# Patient Record
Sex: MALE | Race: BLACK OR AFRICAN AMERICAN | URBAN - METROPOLITAN AREA
[De-identification: names, ages, dates, MRNs, and addresses within clinical notes are randomized per-mention and may not be internally consistent; named-entity substitution may affect disease eponyms.]

---

## 2023-03-17 ENCOUNTER — HOSPITAL ENCOUNTER (INPATIENT)
Age: 68
LOS: 5 days | Discharge: NURSING, REHAB OR LONG-TERM CARE | DRG: 698 | End: 2023-03-22
Attending: EMERGENCY MEDICINE | Admitting: INTERNAL MEDICINE

## 2023-03-17 ENCOUNTER — APPOINTMENT (OUTPATIENT)
Dept: CT IMAGING | Age: 68
DRG: 698 | End: 2023-03-17
Attending: INTERNAL MEDICINE

## 2023-03-17 ENCOUNTER — APPOINTMENT (OUTPATIENT)
Dept: GENERAL RADIOLOGY | Age: 68
DRG: 698 | End: 2023-03-17
Attending: INTERNAL MEDICINE

## 2023-03-17 ENCOUNTER — APPOINTMENT (OUTPATIENT)
Dept: GENERAL RADIOLOGY | Age: 68
DRG: 698 | End: 2023-03-17
Attending: EMERGENCY MEDICINE

## 2023-03-17 DIAGNOSIS — N39.0 SEPSIS DUE TO GRAM-NEGATIVE UTI (HCC): Primary | ICD-10-CM

## 2023-03-17 DIAGNOSIS — A41.9 SEVERE SEPSIS (HCC): ICD-10-CM

## 2023-03-17 DIAGNOSIS — E16.2 HYPOGLYCEMIA: ICD-10-CM

## 2023-03-17 DIAGNOSIS — R65.20 SEVERE SEPSIS (HCC): ICD-10-CM

## 2023-03-17 DIAGNOSIS — A41.50 SEPSIS DUE TO GRAM-NEGATIVE UTI (HCC): Primary | ICD-10-CM

## 2023-03-17 LAB
ALBUMIN SERPL-MCNC: 1.3 G/DL (ref 3.5–5)
ALBUMIN/GLOB SERPL: 0.3 (ref 1.1–2.2)
ALP SERPL-CCNC: 176 U/L (ref 45–117)
ALT SERPL-CCNC: 110 U/L (ref 12–78)
ANION GAP BLD CALC-SCNC: 20 (ref 10–20)
ANION GAP SERPL CALC-SCNC: 2 MMOL/L (ref 5–15)
APPEARANCE UR: CLEAR
AST SERPL-CCNC: 109 U/L (ref 15–37)
BACTERIA URNS QL MICRO: NEGATIVE /HPF
BASE DEFICIT BLD-SCNC: 14.9 MMOL/L
BASOPHILS # BLD: 0 K/UL (ref 0–0.1)
BASOPHILS # BLD: 0 K/UL (ref 0–0.1)
BASOPHILS NFR BLD: 0 % (ref 0–1)
BASOPHILS NFR BLD: 0 % (ref 0–1)
BILIRUB SERPL-MCNC: 0.3 MG/DL (ref 0.2–1)
BILIRUB UR QL: NEGATIVE
BUN SERPL-MCNC: 10 MG/DL (ref 6–20)
BUN/CREAT SERPL: 16 (ref 12–20)
CA-I BLD-MCNC: 0.53 MMOL/L (ref 1.12–1.32)
CALCIUM SERPL-MCNC: 6.9 MG/DL (ref 8.5–10.1)
CHLORIDE BLD-SCNC: 120 MMOL/L (ref 100–108)
CHLORIDE SERPL-SCNC: 107 MMOL/L (ref 97–108)
CO2 BLD-SCNC: 10 MMOL/L (ref 19–24)
CO2 SERPL-SCNC: 27 MMOL/L (ref 21–32)
COLOR UR: ABNORMAL
CREAT SERPL-MCNC: 0.62 MG/DL (ref 0.7–1.3)
CREAT UR-MCNC: 0.5 MG/DL (ref 0.6–1.3)
DIFFERENTIAL METHOD BLD: ABNORMAL
DIFFERENTIAL METHOD BLD: ABNORMAL
EOSINOPHIL # BLD: 0 K/UL (ref 0–0.4)
EOSINOPHIL # BLD: 0 K/UL (ref 0–0.4)
EOSINOPHIL NFR BLD: 0 % (ref 0–7)
EOSINOPHIL NFR BLD: 0 % (ref 0–7)
EPITH CASTS URNS QL MICRO: ABNORMAL /LPF
ERYTHROCYTE [DISTWIDTH] IN BLOOD BY AUTOMATED COUNT: 21.8 % (ref 11.5–14.5)
ERYTHROCYTE [DISTWIDTH] IN BLOOD BY AUTOMATED COUNT: 21.8 % (ref 11.5–14.5)
GLOBULIN SER CALC-MCNC: 4.6 G/DL (ref 2–4)
GLUCOSE BLD STRIP.AUTO-MCNC: 39 MG/DL (ref 74–106)
GLUCOSE BLD STRIP.AUTO-MCNC: 69 MG/DL (ref 65–117)
GLUCOSE BLD STRIP.AUTO-MCNC: 92 MG/DL (ref 65–117)
GLUCOSE BLD STRIP.AUTO-MCNC: 99 MG/DL (ref 65–117)
GLUCOSE BLD STRIP.AUTO-MCNC: 99 MG/DL (ref 65–117)
GLUCOSE SERPL-MCNC: 118 MG/DL (ref 65–100)
GLUCOSE UR STRIP.AUTO-MCNC: NEGATIVE MG/DL
HCO3 BLDA-SCNC: 11 MMOL/L
HCT VFR BLD AUTO: 26 % (ref 36.6–50.3)
HCT VFR BLD AUTO: 27.6 % (ref 36.6–50.3)
HGB BLD-MCNC: 7.8 G/DL (ref 12.1–17)
HGB BLD-MCNC: 8 G/DL (ref 12.1–17)
HGB UR QL STRIP: ABNORMAL
IMM GRANULOCYTES # BLD AUTO: 0.1 K/UL (ref 0–0.04)
IMM GRANULOCYTES # BLD AUTO: 0.2 K/UL (ref 0–0.04)
IMM GRANULOCYTES NFR BLD AUTO: 1 % (ref 0–0.5)
IMM GRANULOCYTES NFR BLD AUTO: 1 % (ref 0–0.5)
KETONES UR QL STRIP.AUTO: NEGATIVE MG/DL
LACTATE BLD-SCNC: 0.74 MMOL/L (ref 0.4–2)
LACTATE BLD-SCNC: 2.35 MMOL/L (ref 0.4–2)
LEUKOCYTE ESTERASE UR QL STRIP.AUTO: ABNORMAL
LYMPHOCYTES # BLD: 1.1 K/UL (ref 0.8–3.5)
LYMPHOCYTES # BLD: 1.5 K/UL (ref 0.8–3.5)
LYMPHOCYTES NFR BLD: 8 % (ref 12–49)
LYMPHOCYTES NFR BLD: 8 % (ref 12–49)
MCH RBC QN AUTO: 25.2 PG (ref 26–34)
MCH RBC QN AUTO: 25.7 PG (ref 26–34)
MCHC RBC AUTO-ENTMCNC: 29 G/DL (ref 30–36.5)
MCHC RBC AUTO-ENTMCNC: 30 G/DL (ref 30–36.5)
MCV RBC AUTO: 85.8 FL (ref 80–99)
MCV RBC AUTO: 87.1 FL (ref 80–99)
MONOCYTES # BLD: 0.3 K/UL (ref 0–1)
MONOCYTES # BLD: 0.4 K/UL (ref 0–1)
MONOCYTES NFR BLD: 2 % (ref 5–13)
MONOCYTES NFR BLD: 2 % (ref 5–13)
NEUTS SEG # BLD: 11.7 K/UL (ref 1.8–8)
NEUTS SEG # BLD: 16.8 K/UL (ref 1.8–8)
NEUTS SEG NFR BLD: 89 % (ref 32–75)
NEUTS SEG NFR BLD: 89 % (ref 32–75)
NITRITE UR QL STRIP.AUTO: NEGATIVE
NRBC # BLD: 0 K/UL (ref 0–0.01)
NRBC # BLD: 0 K/UL (ref 0–0.01)
NRBC BLD-RTO: 0 PER 100 WBC
NRBC BLD-RTO: 0 PER 100 WBC
PCO2 BLDV: 24 MMHG (ref 41–51)
PH BLDV: 7.26 (ref 7.32–7.42)
PH UR STRIP: 6.5 (ref 5–8)
PLATELET # BLD AUTO: 349 K/UL (ref 150–400)
PLATELET # BLD AUTO: 388 K/UL (ref 150–400)
PMV BLD AUTO: 10.2 FL (ref 8.9–12.9)
PMV BLD AUTO: 9.8 FL (ref 8.9–12.9)
PO2 BLDV: 25 MMHG (ref 25–40)
POTASSIUM BLD-SCNC: 1.9 MMOL/L (ref 3.5–5.5)
POTASSIUM SERPL-SCNC: 4.1 MMOL/L (ref 3.5–5.1)
PROT SERPL-MCNC: 5.9 G/DL (ref 6.4–8.2)
PROT UR STRIP-MCNC: NEGATIVE MG/DL
RBC # BLD AUTO: 3.03 M/UL (ref 4.1–5.7)
RBC # BLD AUTO: 3.17 M/UL (ref 4.1–5.7)
RBC #/AREA URNS HPF: ABNORMAL /HPF (ref 0–5)
RBC MORPH BLD: ABNORMAL
RBC MORPH BLD: ABNORMAL
SAO2 % BLD: 38 %
SERVICE CMNT-IMP: NORMAL
SODIUM BLD-SCNC: 150 MMOL/L (ref 136–145)
SODIUM SERPL-SCNC: 136 MMOL/L (ref 136–145)
SP GR UR REFRACTOMETRY: 1.01
SPECIMEN SITE: ABNORMAL
UA: UC IF INDICATED,UAUC: ABNORMAL
UROBILINOGEN UR QL STRIP.AUTO: 0.2 EU/DL (ref 0.2–1)
WBC # BLD AUTO: 13.2 K/UL (ref 4.1–11.1)
WBC # BLD AUTO: 18.9 K/UL (ref 4.1–11.1)
WBC MORPH BLD: ABNORMAL
WBC URNS QL MICRO: ABNORMAL /HPF (ref 0–4)
YEAST BUDDING URNS QL: PRESENT

## 2023-03-17 PROCEDURE — 80053 COMPREHEN METABOLIC PANEL: CPT

## 2023-03-17 PROCEDURE — 70450 CT HEAD/BRAIN W/O DYE: CPT

## 2023-03-17 PROCEDURE — 36415 COLL VENOUS BLD VENIPUNCTURE: CPT

## 2023-03-17 PROCEDURE — 71045 X-RAY EXAM CHEST 1 VIEW: CPT

## 2023-03-17 PROCEDURE — 87086 URINE CULTURE/COLONY COUNT: CPT

## 2023-03-17 PROCEDURE — 85025 COMPLETE CBC W/AUTO DIFF WBC: CPT

## 2023-03-17 PROCEDURE — 83605 ASSAY OF LACTIC ACID: CPT

## 2023-03-17 PROCEDURE — 96366 THER/PROPH/DIAG IV INF ADDON: CPT

## 2023-03-17 PROCEDURE — 74011250636 HC RX REV CODE- 250/636: Performed by: INTERNAL MEDICINE

## 2023-03-17 PROCEDURE — 93005 ELECTROCARDIOGRAM TRACING: CPT

## 2023-03-17 PROCEDURE — 74011250636 HC RX REV CODE- 250/636: Performed by: EMERGENCY MEDICINE

## 2023-03-17 PROCEDURE — 96360 HYDRATION IV INFUSION INIT: CPT

## 2023-03-17 PROCEDURE — 96365 THER/PROPH/DIAG IV INF INIT: CPT

## 2023-03-17 PROCEDURE — 87040 BLOOD CULTURE FOR BACTERIA: CPT

## 2023-03-17 PROCEDURE — 81001 URINALYSIS AUTO W/SCOPE: CPT

## 2023-03-17 PROCEDURE — 74011000258 HC RX REV CODE- 258: Performed by: INTERNAL MEDICINE

## 2023-03-17 PROCEDURE — 99285 EMERGENCY DEPT VISIT HI MDM: CPT

## 2023-03-17 PROCEDURE — 82947 ASSAY GLUCOSE BLOOD QUANT: CPT

## 2023-03-17 PROCEDURE — 74176 CT ABD & PELVIS W/O CONTRAST: CPT

## 2023-03-17 PROCEDURE — 82962 GLUCOSE BLOOD TEST: CPT

## 2023-03-17 PROCEDURE — 96361 HYDRATE IV INFUSION ADD-ON: CPT

## 2023-03-17 PROCEDURE — 65270000046 HC RM TELEMETRY

## 2023-03-17 PROCEDURE — 74011000258 HC RX REV CODE- 258: Performed by: EMERGENCY MEDICINE

## 2023-03-17 RX ORDER — VANCOMYCIN 1.75 GRAM/500 ML IN 0.9 % SODIUM CHLORIDE INTRAVENOUS
1750 ONCE
Status: COMPLETED | OUTPATIENT
Start: 2023-03-17 | End: 2023-03-18

## 2023-03-17 RX ORDER — DEXTROSE MONOHYDRATE 100 MG/ML
0-250 INJECTION, SOLUTION INTRAVENOUS AS NEEDED
Status: DISCONTINUED | OUTPATIENT
Start: 2023-03-17 | End: 2023-03-22 | Stop reason: HOSPADM

## 2023-03-17 RX ORDER — IBUPROFEN 200 MG
4 TABLET ORAL AS NEEDED
Status: DISCONTINUED | OUTPATIENT
Start: 2023-03-17 | End: 2023-03-18

## 2023-03-17 RX ORDER — SODIUM CHLORIDE 0.9 % (FLUSH) 0.9 %
5-10 SYRINGE (ML) INJECTION AS NEEDED
Status: DISCONTINUED | OUTPATIENT
Start: 2023-03-17 | End: 2023-03-22 | Stop reason: HOSPADM

## 2023-03-17 RX ADMIN — VANCOMYCIN HYDROCHLORIDE 1750 MG: 10 INJECTION, POWDER, LYOPHILIZED, FOR SOLUTION INTRAVENOUS at 22:17

## 2023-03-17 RX ADMIN — SODIUM CHLORIDE, POTASSIUM CHLORIDE, SODIUM LACTATE AND CALCIUM CHLORIDE 1000 ML: 600; 310; 30; 20 INJECTION, SOLUTION INTRAVENOUS at 12:46

## 2023-03-17 RX ADMIN — SODIUM CHLORIDE, POTASSIUM CHLORIDE, SODIUM LACTATE AND CALCIUM CHLORIDE 986 ML: 600; 310; 30; 20 INJECTION, SOLUTION INTRAVENOUS at 13:16

## 2023-03-17 RX ADMIN — CEFEPIME 2 G: 2 INJECTION, POWDER, FOR SOLUTION INTRAVENOUS at 21:31

## 2023-03-17 RX ADMIN — CEFEPIME 2 G: 2 INJECTION, POWDER, FOR SOLUTION INTRAVENOUS at 13:00

## 2023-03-17 NOTE — ED NOTES
eTRANSFER SBAR NOTE    IP UNIT CALLED NOTE IS READY: Yes Spoke to anna    IF there are questions Call transferring nurse (your name) Kian Cherrie at phone # [phone]:    Patient is being transferred to [facility], Room# [phone]    Patient's Chief Complaint on arrival to ED was hypoglycemia and is admitted for Sepsis/hypoglycemia. CODE STATUS: Full Code    VITAL SIGNS (MUST BE WITHIN 1 HOUR OF TRANSFER TO IP UNIT:    TEMP: 97.7  PULSE: 89  RESP: 16  BP: 129/73  PAIN SCORE: 0  MEWS:      ISOLATION/PRECAUTIONS: No   ISOLATION TYPE: standard    Called outstanding consults: No      Are there sign and held orders that need to be released? No   Are all STAT orders completed: Yes    STAT labs collected: No   REPEAT LACTIC ACID DUE? No  TIME DUE:   Critical Labs Results? What? Are there any titrating drips? No   If so what? The following personal items will be sent with the patient during transfer to the floor: All valuables:   ITEM:    ITEM:    ITEM:           ASSESSMENT:    CIWA Assessment: No  Last Score:     NEURO:   NIH SCORE:        GINA SCREENING:          NEURO ASSESSMENT:   Neuro  Neurologic State: Alert (03/17/23 1028)  Orientation Level: Appropriate for age (03/17/23 1028)  Cognition: Appropriate decision making (03/17/23 1028)  Speech: Appropriate for age (03/17/23 1028)  Assessment L Pupil: Brisk (03/17/23 1028)  Assessment R Pupil: Brisk (03/17/23 1028)  LUE Motor Response: Purposeful (03/17/23 1028)  LLE Motor Response: Purposeful (03/17/23 1028)  RUE Motor Response: Purposeful (03/17/23 1028)  RLE Motor Response: Purposeful (03/17/23 1028)    Is patient impulsive? No   Is patient oriented? Yes   Do they follow commands? Yes  Is the patient ambulatory? No  Device need full assist    FALL RISK? No    Is the [place] Assessment completed? Yes  INTERVENTIONS: standard    INTEGUMENTARY:   IS THE PATIENT UNDRESSED? Yes  WOUNDS PRESENT?  No  On admit to ED No   ARE THE WOUNDS DOCUMENTED? No     RESPIRATORY:   Is patient on Oxygen? Yes   OXYGEN: Oxygen Therapy  O2 Device: None (Room air) (23 1024)  IS patient on VENT? No      CARDIAC:   Is cardiac monitoring ordered? Yes Last Rhythm: ns  Patient to transfer with tele box on? Yes  Is patient using a LIFE VEST? No     LINE ACCESS:   Peripheral IV 23 Right (Active)       Peripheral IV 23 Anterior;Proximal;Right Forearm (Active)        /GI:   CONTINENT BOWEL/BLADDER? Yes  URINARY OUTPUT: nephrostomy drains bilateral   Written Order for [facility] Cath? No   CHRONIC OR ACUTE? If CHRONIC, is it 1days old, was it changed prior to specimen collection? WAS UA WITH REFLEX SENT TO LAB? IF NO,  COLLECT AND SEND PRIOR TO TRANSPORT TO INPATIENT AREA    RESTRAINTS IN USE:      IS DOCUMENTATION COMPLETE:   Is there a current Order? When does it ?      Additional details as Needed:

## 2023-03-17 NOTE — H&P
Hospitalist Admission Note    NAME: Teofilo Márquez   :  1955   MRN:  [id number]     Date/Time:  3/17/2023 3:54 PM    Patient PCP: None  ______________________________________________________________________  Given the patient's current clinical presentation, I have a high level of concern for decompensation if discharged from the emergency department. Complex decision making was performed, which includes reviewing the patient's available past medical records, laboratory results, and x-ray films. My assessment of this patient's clinical condition and my plan of care is as follows. Assessment / Plan:    Sepsis secondary to UTI  Nephrostomy tube  Admit patient to telemetry  Start patient on IV antibiotic cefepime  Follow-up blood culture follow-up urine culture  Follow-up abdominal CT scan    Change mental status most likely secondary to metabolic encephalopathy  Follow-up head CT scan    Hypoglycemia  Start patient on hypoglycemic protocol  Monitor blood glucose  Check TSH and cortisol level       DM  Patient hypoglycemic   Continue close monitoring FSBS     History of prostate cancer    Pharmacy consult for home med list     Code Status: Full   Surrogate Decision Maker:    DVT Prophylaxis: Loevnox   GI Prophylaxis: not indicated    Baseline:       Subjective:   CHIEF COMPLAINT: change mental status     HISTORY OF PRESENT ILLNESS:     78years old male from home with past medical history significant for DM, malignant prostate cancer presented to the hospital for evaluation of change mental status, patient was found to be hypoglycemic, blood sugar was 31 at facilities, D10 was given by EMS, blood glucose was improved to 69, blood work was significant show elevated white blood cell count 18.9, urinalysis was positive for leukocyte esterase. We were asked to admit for work up and evaluation of the above problems. No past medical history on file.      No past surgical history on file.    Social History     Tobacco Use    Smoking status: Not on file    Smokeless tobacco: Not on file   Substance Use Topics    Alcohol use: Not on file        No family history on file. No Known Allergies     Prior to Admission medications    Not on File       REVIEW OF SYSTEMS:     I am not able to complete the review of systems because:    The patient is intubated and sedated    The patient has altered mental status due to his acute medical problems    The patient has baseline aphasia from prior stroke(s)    The patient has baseline dementia and is not reliable historian    The patient is in acute medical distress and unable to provide information           Total of 12 systems reviewed as follows:       POSITIVE= underlined text  Negative = text not underlined  General:  fever, chills, sweats, generalized weakness, weight loss/gain,      loss of appetite   Eyes:    blurred vision, eye pain, loss of vision, double vision  ENT:    rhinorrhea, pharyngitis   Respiratory:   cough, sputum production, SOB, PARIKH, wheezing, pleuritic pain   Cardiology:   chest pain, palpitations, orthopnea, PND, edema, syncope   Gastrointestinal:  abdominal pain , N/V, diarrhea, dysphagia, constipation, bleeding   Genitourinary:  frequency, urgency, dysuria, hematuria, incontinence   Muskuloskeletal :  arthralgia, myalgia, back pain  Hematology:  easy bruising, nose or gum bleeding, lymphadenopathy   Dermatological: rash, ulceration, pruritis, color change / jaundice  Endocrine:   hot flashes or polydipsia   Neurological:  headache, dizziness, confusion, focal weakness, paresthesia,     Speech difficulties, memory loss, gait difficulty  Psychological: Feelings of anxiety, depression, agitation    Objective:   VITALS:    Visit Vitals  BP (!) 145/84   Pulse 84   Temp (!) 95 °F (35 °C)   Resp 16   Ht 6' 1\" (1.854 m)   Wt 66.2 kg (146 lb)   SpO2 100%   BMI 19.26 kg/m²       PHYSICAL EXAM:    General:    Alert, cooperative, no distress, appears stated age. HEENT: Atraumatic, anicteric sclerae, pink conjunctivae     No oral ulcers, mucosa moist, throat clear, dentition fair  Neck:  Supple, symmetrical,  thyroid: non tender  Lungs:   Clear to auscultation bilaterally. No Wheezing or Rhonchi. No rales. Chest wall:  No tenderness  No Accessory muscle use. Heart:   Regular  rhythm,  No  murmur   No edema  Abdomen:   Soft, non-tender. Not distended. Bowel sounds normal  Extremities: No cyanosis. No clubbing,      Skin turgor normal, Capillary refill normal, Radial dial pulse 2+  Skin:     Not pale. Not Jaundiced  No rashes   Psych:  Good insight. Not depressed. Not anxious or agitated. Neurologic: EOMs intact. No facial asymmetry. No aphasia or slurred speech. Symmetrical strength, Sensation grossly intact. Alert and oriented X 4.     _______________________________________________________________________  Care Plan discussed with:    Comments   Patient     Family      RN     Care Manager                    Consultant:      _______________________________________________________________________  Expected  Disposition:   Home with Family    HH/PT/OT/RN    SNF/LTC    CHELLE    ________________________________________________________________________  TOTAL TIME:  54 Minutes    Critical Care Provided     Minutes non procedure based      Comments     Reviewed previous records   >50% of visit spent in counseling and coordination of care  Discussion with patient and/or family and questions answered       ________________________________________________________________________  Signed: Silvano Rubio MD    Procedures: see electronic medical records for all procedures/Xrays and details which were not copied into this note but were reviewed prior to creation of Plan.     LAB DATA REVIEWED:    Recent Results (from the past 24 hour(s))   GLUCOSE, POC    Collection Time: 03/17/23 10:09 AM   Result Value Ref Range    Glucose (POC) 69 65 - 117 mg/dL Performed by [employer] \"Rhys\" (TRV RN)    GLUCOSE, POC    Collection Time: 03/17/23 10:33 AM   Result Value Ref Range    Glucose (POC) 99 65 - 117 mg/dL    Performed by [employer] \"Rhys\" (TRV RN)    CBC WITH AUTOMATED DIFF    Collection Time: 03/17/23 11:35 AM   Result Value Ref Range    WBC 13.2 (H) 4.1 - 11.1 K/uL    RBC 3.17 (L) 4.10 - 5.70 M/uL    HGB 8.0 (L) 12.1 - 17.0 g/dL    HCT 27.6 (L) 36.6 - 50.3 %    MCV 87.1 80.0 - 99.0 FL    MCH 25.2 (L) 26.0 - 34.0 PG    MCHC 29.0 (L) 30.0 - 36.5 g/dL    RDW 21.8 (H) 11.5 - 14.5 %    PLATELET 915 068 - 467 K/uL    MPV 9.8 8.9 - 12.9 FL    NRBC 0.0 0  WBC    ABSOLUTE NRBC 0.00 0.00 - 0.01 K/uL    NEUTROPHILS 89 (H) 32 - 75 %    LYMPHOCYTES 8 (L) 12 - 49 %    MONOCYTES 2 (L) 5 - 13 %    EOSINOPHILS 0 0 - 7 %    BASOPHILS 0 0 - 1 %    IMMATURE GRANULOCYTES 1 (H) 0.0 - 0.5 %    ABS. NEUTROPHILS 11.7 (H) 1.8 - 8.0 K/UL    ABS. LYMPHOCYTES 1.1 0.8 - 3.5 K/UL    ABS. MONOCYTES 0.3 0.0 - 1.0 K/UL    ABS. EOSINOPHILS 0.0 0.0 - 0.4 K/UL    ABS. BASOPHILS 0.0 0.0 - 0.1 K/UL    ABS. IMM. GRANS. 0.1 (H) 0.00 - 0.04 K/UL    DF SMEAR SCANNED      RBC COMMENTS ANISOCYTOSIS  2+       POC LACTIC ACID    Collection Time: 03/17/23 11:38 AM   Result Value Ref Range    Lactic Acid (POC) 2.35 (HH) 0.40 - 2.00 mmol/L   CBC WITH AUTOMATED DIFF    Collection Time: 03/17/23 12:38 PM   Result Value Ref Range    WBC 18.9 (H) 4.1 - 11.1 K/uL    RBC 3.03 (L) 4.10 - 5.70 M/uL    HGB 7.8 (L) 12.1 - 17.0 g/dL    HCT 26.0 (L) 36.6 - 50.3 %    MCV 85.8 80.0 - 99.0 FL    MCH 25.7 (L) 26.0 - 34.0 PG    MCHC 30.0 30.0 - 36.5 g/dL    RDW 21.8 (H) 11.5 - 14.5 %    PLATELET 937 573 - 855 K/uL    MPV 10.2 8.9 - 12.9 FL    NRBC 0.0 0  WBC    ABSOLUTE NRBC 0.00 0.00 - 0.01 K/uL    NEUTROPHILS 89 (H) 32 - 75 %    LYMPHOCYTES 8 (L) 12 - 49 %    MONOCYTES 2 (L) 5 - 13 %    EOSINOPHILS 0 0 - 7 %    BASOPHILS 0 0 - 1 %    IMMATURE GRANULOCYTES 1 (H) 0.0 - 0.5 %    ABS.  NEUTROPHILS 16.8 (H) 1.8 - 8.0 K/UL    ABS. LYMPHOCYTES 1.5 0.8 - 3.5 K/UL    ABS. MONOCYTES 0.4 0.0 - 1.0 K/UL    ABS. EOSINOPHILS 0.0 0.0 - 0.4 K/UL    ABS. BASOPHILS 0.0 0.0 - 0.1 K/UL    ABS. IMM. GRANS. 0.2 (H) 0.00 - 0.04 K/UL    DF SMEAR SCANNED      RBC COMMENTS ANISOCYTOSIS  1+        WBC COMMENTS VACUOLATED POLYS     METABOLIC PANEL, COMPREHENSIVE    Collection Time: 03/17/23 12:38 PM   Result Value Ref Range    Sodium 136 136 - 145 mmol/L    Potassium 4.1 3.5 - 5.1 mmol/L    Chloride 107 97 - 108 mmol/L    CO2 27 21 - 32 mmol/L    Anion gap 2 (L) 5 - 15 mmol/L    Glucose 118 (H) 65 - 100 mg/dL    BUN 10 6 - 20 MG/DL    Creatinine 0.62 (L) 0.70 - 1.30 MG/DL    BUN/Creatinine ratio 16 12 - 20      eGFR >60 >60 ml/min/1.73m2    Calcium 6.9 (L) 8.5 - 10.1 MG/DL    Bilirubin, total 0.3 0.2 - 1.0 MG/DL    ALT (SGPT) 110 (H) 12 - 78 U/L    AST (SGOT) 109 (H) 15 - 37 U/L    Alk.  phosphatase 176 (H) 45 - 117 U/L    Protein, total 5.9 (L) 6.4 - 8.2 g/dL    Albumin 1.3 (L) 3.5 - 5.0 g/dL    Globulin 4.6 (H) 2.0 - 4.0 g/dL    A-G Ratio 0.3 (L) 1.1 - 2.2     URINALYSIS W/ REFLEX CULTURE    Collection Time: 03/17/23  2:30 PM    Specimen: Urine   Result Value Ref Range    Color YELLOW/STRAW      Appearance CLEAR CLEAR      Specific gravity 1.009      pH (UA) 6.5 5.0 - 8.0      Protein Negative NEG mg/dL    Glucose Negative NEG mg/dL    Ketone Negative NEG mg/dL    Bilirubin Negative NEG      Blood TRACE (A) NEG      Urobilinogen 0.2 0.2 - 1.0 EU/dL    Nitrites Negative NEG      Leukocyte Esterase MODERATE (A) NEG      WBC 10-20 0 - 4 /hpf    RBC 0-5 0 - 5 /hpf    Epithelial cells FEW FEW /lpf    Bacteria Negative NEG /hpf    UA:UC IF INDICATED URINE CULTURE ORDERED (A) CNI      Budding yeast PRESENT (A) NEG     BLOOD GAS,CHEM8,LACTIC ACID POC    Collection Time: 03/17/23  3:09 PM   Result Value Ref Range    pH, venous (POC) 7.26 (L) 7.32 - 7.42      pCO2, venous (POC) 24.0 (L) 41 - 51 MMHG    pO2, venous (POC) 25 25 - 40 mmHg    BICARBONATE 11 mmol/L    Base deficit (POC) 14.9 mmol/L    O2 SAT 38 %    Sodium,  (H) 136 - 145 MMOL/L    Potassium, POC 1.9 (LL) 3.5 - 5.5 MMOL/L    Chloride,  (H) 100 - 108 MMOL/L    CO2, POC 10 (L) 19 - 24 MMOL/L    Anion gap, POC 20 10 - 20      Glucose, POC 39 (LL) 74 - 106 MG/DL    Creatinine, POC 0.5 (L) 0.6 - 1.3 MG/DL    eGFR (POC) >60 >60 ml/min/1.73m2    Calcium, ionized (POC) 0.53 (LL) 1.12 - 1.32 mmol/L    Lactic Acid (POC) 0.74 0.40 - 2.00 mmol/L    Sample source VENOUS BLOOD     GLUCOSE, POC    Collection Time: 03/17/23  3:14 PM   Result Value Ref Range    Glucose (POC) 92 65 - 117 mg/dL    Performed by Sd Lopez \"Rhys\" (RAIN RN)    GLUCOSE, POC    Collection Time: 03/17/23  3:16 PM   Result Value Ref Range    Glucose (POC) 99 65 - 117 mg/dL    Performed by Sd Lopez \"Rhys\" (RAIN RN)

## 2023-03-17 NOTE — ED PROVIDER NOTES
EMERGENCY DEPARTMENT HISTORY AND PHYSICAL EXAM    Date: 3/17/2023  Patient Name: Dillon Cade  Patient Age and Sex: 78 y.o. male  MRN:  [id number]  CSN:  [id number]    History of Present Illness     Chief Complaint   Patient presents with    Low Blood Sugar     Pt is alert and oriented x 4 with a bgl at triage of 69. Pt able to drink orange juice and answer questions appropriately. Will recheck bgl in 15. Pt had bgl of 31 at facility. EMS treated with D10 drip en route. History Provided By: Patient    Ability to gather history was limited by:  HPI Limitations : Altered Mental Status    HPI: Dillon Cade, 78 y.o. male with history of diabetes, malignant prostate cancer, sent to the emergency department from his nursing home for concerns for hypoglycemia and altered mental status. He reportedly had a blood sugar of 31 at the facility 30 minutes prior to ED arrival.  He was administered a small amount of D10 by EMS in route to the hospital, blood glucose of 69 upon arrival in the ED. No reported fevers or chills. He denies any symptoms or pain. Tobacco Use      Smoking status: Not on file      Smokeless tobacco: Not on file     Past History   The patient's medical, surgical, and social history were reviewed by me today. Current Medications:  No current facility-administered medications on file prior to encounter. No current outpatient medications on file prior to encounter. No past medical history on file. No past surgical history on file. Allergies:  No Known Allergies  Review of Systems   A Review of Systems was reviewed by me today during this encounter. Pertinent positive and negative elements are noted in the HPI and MDM sections. Review of Systems   Constitutional:  Positive for fatigue. Negative for fever. Respiratory:  Negative for shortness of breath. Cardiovascular:  Negative for chest pain. Gastrointestinal:  Negative for abdominal pain.    Psychiatric/Behavioral: Positive for confusion. All other systems reviewed and are negative. Physical Exam   Vital Signs  Patient Vitals for the past 8 hrs:   Temp Pulse Resp BP SpO2   03/17/23 1254 -- 84 -- (!) 145/84 100 %   03/17/23 1239 -- 86 -- (!) 143/78 100 %   03/17/23 1224 -- 82 -- 136/75 100 %   03/17/23 1209 -- 83 -- 121/65 100 %   03/17/23 1154 -- 82 -- 137/76 100 %   03/17/23 1139 -- 80 -- (!) 140/81 100 %   03/17/23 1124 (!) 95 °F (35 °C) 85 -- (!) 149/87 100 %   03/17/23 1109 -- 88 -- (!) 142/77 99 %   03/17/23 1054 -- 98 -- (!) 140/123 100 %   03/17/23 1039 -- 96 -- (!) 153/93 99 %   03/17/23 1024 (!) 93.3 °F (34.1 °C) 77 16 (!) 150/93 99 %          Physical Exam  Vitals and nursing note reviewed. Constitutional:       General: He is not in acute distress. Appearance: Normal appearance. He is well-developed. He is not ill-appearing. HENT:      Mouth/Throat:      Mouth: Mucous membranes are dry. Cardiovascular:      Rate and Rhythm: Normal rate and regular rhythm. Heart sounds: Normal heart sounds. No murmur heard. Pulmonary:      Effort: Pulmonary effort is normal. No respiratory distress. Breath sounds: Normal breath sounds. No wheezing. Abdominal:      General: There is no distension. Palpations: Abdomen is soft. Tenderness: There is no abdominal tenderness. Musculoskeletal:         General: No deformity. Normal range of motion. Cervical back: Normal range of motion and neck supple. Skin:     General: Skin is warm and dry. Findings: No rash. Neurological:      General: No focal deficit present. Mental Status: He is alert. Mental status is at baseline. Comments: Alert, seems perhaps mildly demented   Psychiatric:         Mood and Affect: Affect is flat. Behavior: Behavior is slowed.        Diagnostic Study Results   Labs  Recent Results (from the past 24 hour(s))   GLUCOSE, POC    Collection Time: 03/17/23 10:09 AM   Result Value Ref Range    Glucose (POC) 69 65 - 117 mg/dL    Performed by Wesley Orocso \"Rhys\" (TRV RN)    GLUCOSE, POC    Collection Time: 03/17/23 10:33 AM   Result Value Ref Range    Glucose (POC) 99 65 - 117 mg/dL    Performed by Wesley Orosco \"Rhys\" (TRV RN)    CBC WITH AUTOMATED DIFF    Collection Time: 03/17/23 11:35 AM   Result Value Ref Range    WBC 13.2 (H) 4.1 - 11.1 K/uL    RBC 3.17 (L) 4.10 - 5.70 M/uL    HGB 8.0 (L) 12.1 - 17.0 g/dL    HCT 27.6 (L) 36.6 - 50.3 %    MCV 87.1 80.0 - 99.0 FL    MCH 25.2 (L) 26.0 - 34.0 PG    MCHC 29.0 (L) 30.0 - 36.5 g/dL    RDW 21.8 (H) 11.5 - 14.5 %    PLATELET 649 320 - 417 K/uL    MPV 9.8 8.9 - 12.9 FL    NRBC 0.0 0  WBC    ABSOLUTE NRBC 0.00 0.00 - 0.01 K/uL    NEUTROPHILS 89 (H) 32 - 75 %    LYMPHOCYTES 8 (L) 12 - 49 %    MONOCYTES 2 (L) 5 - 13 %    EOSINOPHILS 0 0 - 7 %    BASOPHILS 0 0 - 1 %    IMMATURE GRANULOCYTES 1 (H) 0.0 - 0.5 %    ABS. NEUTROPHILS 11.7 (H) 1.8 - 8.0 K/UL    ABS. LYMPHOCYTES 1.1 0.8 - 3.5 K/UL    ABS. MONOCYTES 0.3 0.0 - 1.0 K/UL    ABS. EOSINOPHILS 0.0 0.0 - 0.4 K/UL    ABS. BASOPHILS 0.0 0.0 - 0.1 K/UL    ABS. IMM. GRANS. 0.1 (H) 0.00 - 0.04 K/UL    DF SMEAR SCANNED      RBC COMMENTS ANISOCYTOSIS  2+       POC LACTIC ACID    Collection Time: 03/17/23 11:38 AM   Result Value Ref Range    Lactic Acid (POC) 2.35 (HH) 0.40 - 2.00 mmol/L   CBC WITH AUTOMATED DIFF    Collection Time: 03/17/23 12:38 PM   Result Value Ref Range    WBC 18.9 (H) 4.1 - 11.1 K/uL    RBC 3.03 (L) 4.10 - 5.70 M/uL    HGB 7.8 (L) 12.1 - 17.0 g/dL    HCT 26.0 (L) 36.6 - 50.3 %    MCV 85.8 80.0 - 99.0 FL    MCH 25.7 (L) 26.0 - 34.0 PG    MCHC 30.0 30.0 - 36.5 g/dL    RDW 21.8 (H) 11.5 - 14.5 %    PLATELET 941 046 - 023 K/uL    MPV 10.2 8.9 - 12.9 FL    NRBC 0.0 0  WBC    ABSOLUTE NRBC 0.00 0.00 - 0.01 K/uL    NEUTROPHILS PENDING %    LYMPHOCYTES PENDING %    MONOCYTES PENDING %    EOSINOPHILS PENDING %    BASOPHILS PENDING %    IMMATURE GRANULOCYTES PENDING %    ABS.  NEUTROPHILS PENDING K/UL ABS. LYMPHOCYTES PENDING K/UL    ABS. MONOCYTES PENDING K/UL    ABS. EOSINOPHILS PENDING K/UL    ABS. BASOPHILS PENDING K/UL    ABS. IMM. GRANS. PENDING K/UL    DF PENDING    METABOLIC PANEL, COMPREHENSIVE    Collection Time: 03/17/23 12:38 PM   Result Value Ref Range    Sodium 136 136 - 145 mmol/L    Potassium 4.1 3.5 - 5.1 mmol/L    Chloride 107 97 - 108 mmol/L    CO2 27 21 - 32 mmol/L    Anion gap 2 (L) 5 - 15 mmol/L    Glucose 118 (H) 65 - 100 mg/dL    BUN 10 6 - 20 MG/DL    Creatinine 0.62 (L) 0.70 - 1.30 MG/DL    BUN/Creatinine ratio 16 12 - 20      eGFR >60 >60 ml/min/1.73m2    Calcium 6.9 (L) 8.5 - 10.1 MG/DL    Bilirubin, total 0.3 0.2 - 1.0 MG/DL    ALT (SGPT) 110 (H) 12 - 78 U/L    AST (SGOT) 109 (H) 15 - 37 U/L    Alk. phosphatase 176 (H) 45 - 117 U/L    Protein, total 5.9 (L) 6.4 - 8.2 g/dL    Albumin 1.3 (L) 3.5 - 5.0 g/dL    Globulin 4.6 (H) 2.0 - 4.0 g/dL    A-G Ratio 0.3 (L) 1.1 - 2.2         ==============================================================    Radiologic Studies  CT Results  (Last 48 hours)      None          CXR Results  (Last 48 hours)                 03/17/23 1355  XR CHEST PORT Final result    Impression:  No Acute Disease. Narrative:  EXAM: Portable CXR. 1352 hours. INDICATION: sepsis, likely urosepsis       FINDINGS:   The lungs show no acute finding. Heart is normal in size. There is no pulmonary   edema. There is no evident pneumothorax or pleural effusion. Port catheter   appears satisfactory.                    Critical Care and Billable Procedures   EKG reviewed by ED Physician Benja Foreman in the absence of a cardiologist: Yes  EKG below was independently interpreted by me Michael Valenzuela MD)    Critical Care  Performed by: Kaya Woodard MD  Authorized by: Kaya Woodard MD     Critical care provider statement:     Critical care time (minutes):  40    Critical care time was exclusive of:  Separately billable procedures and treating other patients    Critical care was necessary to treat or prevent imminent or life-threatening deterioration of the following conditions:  Sepsis, metabolic crisis and dehydration    Critical care was time spent personally by me on the following activities:  Ordering and review of laboratory studies, ordering and review of radiographic studies, pulse oximetry, re-evaluation of patient's condition, examination of patient, evaluation of patient's response to treatment, ordering and performing treatments and interventions and review of old charts    Medical Decision Making   I reviewed the patient's most recent Emergency Dept notes and diagnostic tests in formulating my MDM on today's visit. Medications administered during ED course:  Medications   cefepime (MAXIPIME) 2 g in 0.9% sodium chloride (MBP/ADV) 100 mL MBP (2 g IntraVENous New Bag 3/17/23 1300)   lactated ringers bolus infusion 1,000 mL (1,000 mL IntraVENous New Bag 3/17/23 1246)     Followed by   lactated ringers bolus infusion 986 mL (986 mL IntraVENous New Bag 3/17/23 1316)     Medical Decision Making // ED Course // Reassessment:  Kirit Light, 78 y.o. male with history of diabetes, malignant prostate cancer, sent to the emergency department from his nursing home for concerns for hypoglycemia and altered mental status. He reportedly had a blood sugar of 31 at the facility 30 minutes prior to ED arrival.  He was administered a small amount of D10 by EMS in route to the hospital, blood glucose of 69 upon arrival in the ED. No reported fevers or chills. He denies any symptoms or pain. On examination he is hypothermic 93.3 degrees, otherwise reassuring vital signs, no apparent distress or discomfort. Dry mucous membranes. Somewhat slowed and blunted affect. Lungs are clear. Abdomen soft and nontender. Lactate 2.35, WBC of 19. He meets criteria for severe sepsis but not septic shock.     Patient was administered broad-spectrum antibiotics and a fluid bolus. We have not been able to obtain a sample of urine due to his nephrostomy tubes but his chest x-ray is clear and he has had urosepsis in the past and is source of sepsis is strongly suspected to be the urinary tract. Admit to medicine. Stable blood pressure. [id number] [facility] ED SEPSIS NOTE:     12:01 PM The patient now meets criteria for: Severe Sepsis    Fluid resuscitation with: 30 mL/kg crystalloid bolus  Due to concern for rapidly advancing infection and deterioration of patient's condition, antibiotics are started STAT and cultures ordered. Current Ideal Body Weight: Ideal body weight: 79.9 kg (176 lb 2.4 oz)    ===========================================    I performed a sepsis reassessment of the patient's clinical volume status and tissue perfusion at time 2:43 PM      Radiology results independently interpreted by me:     External Records reviewed: Prior medical records and Previous Laboratory studies    Consults:  IP CONSULT TO HOSPITALIST    Tests considered but not performed:     Differential Diagnoses ruled out: Septic shock, pneumonia    Final Diagnosis:   1. Sepsis due to gram-negative UTI ([address]    2. Severe sepsis ([address]    3. Hypoglycemia        Additional documentation if relevant for this encounter     Critical Care time exclusive of other billable procedures: 40 minutes    Diagnosis and Disposition     Disposition:  Admitted    Final Diagnosis:   1. Sepsis due to gram-negative UTI ([address]    2. Severe sepsis ([address]    3. Hypoglycemia        There are no discharge medications for this patient. Follow up: Follow-up Information    None       Disclaimers   I was the first provider for this patient on this visit. To the best of my ability I reviewed relevant prior medical records, electrocardiograms, laboratories, and radiologic studies. The patient's presenting problems were discussed, and the patient was in agreement with the care plan formulated and outlined with them.      Please note that this dictation was completed with Dragon voice recognition software. Quite often unanticipated grammatical, syntax, homophones, and other interpretive errors are inadvertently transcribed by the computer software. Please disregard these errors and excuse any errors that have escaped final proofreading. Simran Dugan MD    I personally performed the services described in this documentation on this date 3/17/2023 for patient Annabelle Cadena.       Simran Dugan MD  11:04 AM

## 2023-03-17 NOTE — ED TRIAGE NOTES
Pt is alert and oriented x 4 with a bgl at triage of 69. Pt able to drink orange juice and answer questions appropriately. Will recheck bgl in 15. Pt had bgl of 31 at facility. EMS treated with D10 drip en route.

## 2023-03-18 ENCOUNTER — APPOINTMENT (OUTPATIENT)
Dept: ULTRASOUND IMAGING | Age: 68
DRG: 698 | End: 2023-03-18
Attending: PHYSICIAN ASSISTANT

## 2023-03-18 LAB
ALBUMIN SERPL-MCNC: 1.3 G/DL (ref 3.5–5)
ALBUMIN/GLOB SERPL: 0.3 (ref 1.1–2.2)
ALP SERPL-CCNC: 180 U/L (ref 45–117)
ALT SERPL-CCNC: 117 U/L (ref 12–78)
ANION GAP SERPL CALC-SCNC: 6 MMOL/L (ref 5–15)
APPEARANCE UR: CLEAR
AST SERPL-CCNC: 96 U/L (ref 15–37)
BACTERIA SPEC CULT: NORMAL
BACTERIA URNS QL MICRO: ABNORMAL /HPF
BASOPHILS # BLD: 0 K/UL (ref 0–0.1)
BASOPHILS NFR BLD: 0 % (ref 0–1)
BILIRUB SERPL-MCNC: 0.3 MG/DL (ref 0.2–1)
BILIRUB UR QL: NEGATIVE
BUN SERPL-MCNC: 12 MG/DL (ref 6–20)
BUN/CREAT SERPL: 16 (ref 12–20)
CALCIUM SERPL-MCNC: 6.9 MG/DL (ref 8.5–10.1)
CHLORIDE SERPL-SCNC: 110 MMOL/L (ref 97–108)
CO2 SERPL-SCNC: 24 MMOL/L (ref 21–32)
COLOR UR: ABNORMAL
CORTIS AM PEAK SERPL-MCNC: 11.9 UG/DL (ref 4.3–22.45)
CREAT SERPL-MCNC: 0.74 MG/DL (ref 0.7–1.3)
DIFFERENTIAL METHOD BLD: ABNORMAL
EOSINOPHIL # BLD: 0 K/UL (ref 0–0.4)
EOSINOPHIL NFR BLD: 0 % (ref 0–7)
EPITH CASTS URNS QL MICRO: ABNORMAL /LPF
ERYTHROCYTE [DISTWIDTH] IN BLOOD BY AUTOMATED COUNT: 21.7 % (ref 11.5–14.5)
GLOBULIN SER CALC-MCNC: 4.8 G/DL (ref 2–4)
GLUCOSE BLD STRIP.AUTO-MCNC: 126 MG/DL (ref 65–117)
GLUCOSE BLD STRIP.AUTO-MCNC: 129 MG/DL (ref 65–117)
GLUCOSE BLD STRIP.AUTO-MCNC: 136 MG/DL (ref 65–117)
GLUCOSE BLD STRIP.AUTO-MCNC: 151 MG/DL (ref 65–117)
GLUCOSE BLD STRIP.AUTO-MCNC: 207 MG/DL (ref 65–117)
GLUCOSE BLD STRIP.AUTO-MCNC: 295 MG/DL (ref 65–117)
GLUCOSE BLD STRIP.AUTO-MCNC: 36 MG/DL (ref 65–117)
GLUCOSE BLD STRIP.AUTO-MCNC: 39 MG/DL (ref 65–117)
GLUCOSE BLD STRIP.AUTO-MCNC: 48 MG/DL (ref 65–117)
GLUCOSE BLD STRIP.AUTO-MCNC: 77 MG/DL (ref 65–117)
GLUCOSE SERPL-MCNC: 42 MG/DL (ref 65–100)
GLUCOSE UR STRIP.AUTO-MCNC: NEGATIVE MG/DL
HCT VFR BLD AUTO: 21.7 % (ref 36.6–50.3)
HGB BLD-MCNC: 6.7 G/DL (ref 12.1–17)
HGB UR QL STRIP: ABNORMAL
IMM GRANULOCYTES # BLD AUTO: 0.1 K/UL (ref 0–0.04)
IMM GRANULOCYTES NFR BLD AUTO: 1 % (ref 0–0.5)
KETONES UR QL STRIP.AUTO: NEGATIVE MG/DL
LACTATE SERPL-SCNC: 1.1 MMOL/L (ref 0.4–2)
LEUKOCYTE ESTERASE UR QL STRIP.AUTO: ABNORMAL
LYMPHOCYTES # BLD: 3.9 K/UL (ref 0.8–3.5)
LYMPHOCYTES NFR BLD: 30 % (ref 12–49)
MCH RBC QN AUTO: 26.2 PG (ref 26–34)
MCHC RBC AUTO-ENTMCNC: 30.9 G/DL (ref 30–36.5)
MCV RBC AUTO: 84.8 FL (ref 80–99)
MONOCYTES # BLD: 0.7 K/UL (ref 0–1)
MONOCYTES NFR BLD: 5 % (ref 5–13)
NEUTS SEG # BLD: 8.3 K/UL (ref 1.8–8)
NEUTS SEG NFR BLD: 64 % (ref 32–75)
NITRITE UR QL STRIP.AUTO: NEGATIVE
NRBC # BLD: 0 K/UL (ref 0–0.01)
NRBC BLD-RTO: 0 PER 100 WBC
PH UR STRIP: 6.5 (ref 5–8)
PLATELET # BLD AUTO: 317 K/UL (ref 150–400)
PMV BLD AUTO: 9.5 FL (ref 8.9–12.9)
POTASSIUM SERPL-SCNC: 4.3 MMOL/L (ref 3.5–5.1)
PROT SERPL-MCNC: 6.1 G/DL (ref 6.4–8.2)
PROT UR STRIP-MCNC: 30 MG/DL
RBC # BLD AUTO: 2.56 M/UL (ref 4.1–5.7)
RBC #/AREA URNS HPF: ABNORMAL /HPF (ref 0–5)
RBC MORPH BLD: ABNORMAL
SERVICE CMNT-IMP: ABNORMAL
SERVICE CMNT-IMP: NORMAL
SERVICE CMNT-IMP: NORMAL
SODIUM SERPL-SCNC: 140 MMOL/L (ref 136–145)
SP GR UR REFRACTOMETRY: 1.01
TSH SERPL DL<=0.05 MIU/L-ACNC: 0.61 UIU/ML (ref 0.36–3.74)
UROBILINOGEN UR QL STRIP.AUTO: 0.2 EU/DL (ref 0.2–1)
WBC # BLD AUTO: 13 K/UL (ref 4.1–11.1)
WBC URNS QL MICRO: ABNORMAL /HPF (ref 0–4)

## 2023-03-18 PROCEDURE — 36415 COLL VENOUS BLD VENIPUNCTURE: CPT

## 2023-03-18 PROCEDURE — 74011000250 HC RX REV CODE- 250: Performed by: INTERNAL MEDICINE

## 2023-03-18 PROCEDURE — 87040 BLOOD CULTURE FOR BACTERIA: CPT

## 2023-03-18 PROCEDURE — 74011250636 HC RX REV CODE- 250/636: Performed by: INTERNAL MEDICINE

## 2023-03-18 PROCEDURE — 74011250637 HC RX REV CODE- 250/637: Performed by: PHYSICIAN ASSISTANT

## 2023-03-18 PROCEDURE — 76705 ECHO EXAM OF ABDOMEN: CPT

## 2023-03-18 PROCEDURE — 81001 URINALYSIS AUTO W/SCOPE: CPT

## 2023-03-18 PROCEDURE — 74011000258 HC RX REV CODE- 258: Performed by: INTERNAL MEDICINE

## 2023-03-18 PROCEDURE — 82962 GLUCOSE BLOOD TEST: CPT

## 2023-03-18 PROCEDURE — 65270000046 HC RM TELEMETRY

## 2023-03-18 PROCEDURE — 74011250636 HC RX REV CODE- 250/636: Performed by: PHYSICIAN ASSISTANT

## 2023-03-18 PROCEDURE — 74011000258 HC RX REV CODE- 258: Performed by: PHYSICIAN ASSISTANT

## 2023-03-18 PROCEDURE — 74011636637 HC RX REV CODE- 636/637: Performed by: PHYSICIAN ASSISTANT

## 2023-03-18 PROCEDURE — 74011000250 HC RX REV CODE- 250: Performed by: PHYSICIAN ASSISTANT

## 2023-03-18 PROCEDURE — 82533 TOTAL CORTISOL: CPT

## 2023-03-18 PROCEDURE — 84443 ASSAY THYROID STIM HORMONE: CPT

## 2023-03-18 PROCEDURE — 83605 ASSAY OF LACTIC ACID: CPT

## 2023-03-18 RX ORDER — DEXTROSE MONOHYDRATE AND SODIUM CHLORIDE 5; .45 G/100ML; G/100ML
50 INJECTION, SOLUTION INTRAVENOUS CONTINUOUS
Status: DISCONTINUED | OUTPATIENT
Start: 2023-03-18 | End: 2023-03-22 | Stop reason: HOSPADM

## 2023-03-18 RX ORDER — LORAZEPAM 0.5 MG/1
0.5 TABLET ORAL
COMMUNITY

## 2023-03-18 RX ORDER — INSULIN LISPRO 100 [IU]/ML
INJECTION, SOLUTION INTRAVENOUS; SUBCUTANEOUS
Status: DISCONTINUED | OUTPATIENT
Start: 2023-03-18 | End: 2023-03-22 | Stop reason: HOSPADM

## 2023-03-18 RX ORDER — ATORVASTATIN CALCIUM 10 MG/1
10 TABLET, FILM COATED ORAL DAILY
COMMUNITY

## 2023-03-18 RX ORDER — OXYCODONE HCL 5 MG/5 ML
5 SOLUTION, ORAL ORAL
Status: DISCONTINUED | OUTPATIENT
Start: 2023-03-18 | End: 2023-03-22 | Stop reason: HOSPADM

## 2023-03-18 RX ORDER — LORAZEPAM 0.5 MG/1
0.5 TABLET ORAL
Status: DISCONTINUED | OUTPATIENT
Start: 2023-03-18 | End: 2023-03-22 | Stop reason: HOSPADM

## 2023-03-18 RX ORDER — IBUPROFEN 200 MG
16 TABLET ORAL AS NEEDED
Status: DISCONTINUED | OUTPATIENT
Start: 2023-03-18 | End: 2023-03-22 | Stop reason: HOSPADM

## 2023-03-18 RX ORDER — ENOXAPARIN SODIUM 100 MG/ML
40 INJECTION SUBCUTANEOUS EVERY 24 HOURS
Status: DISCONTINUED | OUTPATIENT
Start: 2023-03-18 | End: 2023-03-22 | Stop reason: HOSPADM

## 2023-03-18 RX ORDER — MORPHINE SULFATE 20 MG/ML
10 SOLUTION ORAL
COMMUNITY

## 2023-03-18 RX ORDER — DEXAMETHASONE 4 MG/1
8 TABLET ORAL AS NEEDED
Status: ON HOLD | COMMUNITY
End: 2023-03-18

## 2023-03-18 RX ORDER — OXYCODONE HYDROCHLORIDE 5 MG/1
5 CAPSULE ORAL
COMMUNITY

## 2023-03-18 RX ORDER — ONDANSETRON 2 MG/ML
4 INJECTION INTRAMUSCULAR; INTRAVENOUS
Status: DISCONTINUED | OUTPATIENT
Start: 2023-03-18 | End: 2023-03-22 | Stop reason: HOSPADM

## 2023-03-18 RX ORDER — INSULIN GLARGINE 100 [IU]/ML
50 INJECTION, SOLUTION SUBCUTANEOUS
COMMUNITY

## 2023-03-18 RX ORDER — MIRTAZAPINE 15 MG/1
15 TABLET, FILM COATED ORAL
COMMUNITY

## 2023-03-18 RX ORDER — ACETAMINOPHEN 650 MG/1
650 SUPPOSITORY RECTAL
COMMUNITY

## 2023-03-18 RX ORDER — IBUPROFEN 200 MG
4 TABLET ORAL AS NEEDED
Status: DISCONTINUED | OUTPATIENT
Start: 2023-03-18 | End: 2023-03-18

## 2023-03-18 RX ORDER — MIDODRINE HYDROCHLORIDE 2.5 MG/1
2.5 TABLET ORAL
COMMUNITY

## 2023-03-18 RX ORDER — DEXTROSE MONOHYDRATE 100 MG/ML
0-250 INJECTION, SOLUTION INTRAVENOUS AS NEEDED
Status: DISCONTINUED | OUTPATIENT
Start: 2023-03-18 | End: 2023-03-18

## 2023-03-18 RX ORDER — INSULIN LISPRO 100 [IU]/ML
INJECTION, SOLUTION INTRAVENOUS; SUBCUTANEOUS
Status: DISCONTINUED | OUTPATIENT
Start: 2023-03-18 | End: 2023-03-18

## 2023-03-18 RX ORDER — FACIAL-BODY WIPES
10 EACH TOPICAL
COMMUNITY

## 2023-03-18 RX ORDER — PREDNISONE 5 MG/1
5 TABLET ORAL 2 TIMES DAILY
Status: ON HOLD | COMMUNITY
End: 2023-03-18

## 2023-03-18 RX ORDER — INSULIN ASPART 100 [IU]/ML
8 INJECTION, SOLUTION INTRAVENOUS; SUBCUTANEOUS
COMMUNITY

## 2023-03-18 RX ORDER — LANOLIN ALCOHOL/MO/W.PET/CERES
3 CREAM (GRAM) TOPICAL
Status: DISCONTINUED | OUTPATIENT
Start: 2023-03-18 | End: 2023-03-22 | Stop reason: HOSPADM

## 2023-03-18 RX ORDER — HYOSCYAMINE SULFATE 0.12 MG/1
0.12 TABLET SUBLINGUAL
COMMUNITY

## 2023-03-18 RX ORDER — DIPHENHYDRAMINE HYDROCHLORIDE 50 MG/ML
25 INJECTION, SOLUTION INTRAMUSCULAR; INTRAVENOUS
Status: DISCONTINUED | OUTPATIENT
Start: 2023-03-18 | End: 2023-03-22 | Stop reason: HOSPADM

## 2023-03-18 RX ORDER — POLYETHYLENE GLYCOL 3350 17 G/17G
17 POWDER, FOR SOLUTION ORAL
Status: DISCONTINUED | OUTPATIENT
Start: 2023-03-18 | End: 2023-03-22 | Stop reason: HOSPADM

## 2023-03-18 RX ORDER — MIDODRINE HYDROCHLORIDE 2.5 MG/1
2.5 TABLET ORAL
Status: DISCONTINUED | OUTPATIENT
Start: 2023-03-18 | End: 2023-03-22 | Stop reason: HOSPADM

## 2023-03-18 RX ORDER — ATORVASTATIN CALCIUM 40 MG/1
40 TABLET, FILM COATED ORAL DAILY
Status: ON HOLD | COMMUNITY
End: 2023-03-18

## 2023-03-18 RX ADMIN — DEXTROSE AND SODIUM CHLORIDE 50 ML/HR: 5; 450 INJECTION, SOLUTION INTRAVENOUS at 12:41

## 2023-03-18 RX ADMIN — PIPERACILLIN AND TAZOBACTAM 3.38 G: 3; .375 INJECTION, POWDER, FOR SOLUTION INTRAVENOUS at 19:12

## 2023-03-18 RX ADMIN — Medication 16 G: at 15:19

## 2023-03-18 RX ADMIN — DEXTROSE MONOHYDRATE 250 ML: 100 INJECTION, SOLUTION INTRAVENOUS at 05:50

## 2023-03-18 RX ADMIN — ENOXAPARIN SODIUM 40 MG: 100 INJECTION SUBCUTANEOUS at 12:44

## 2023-03-18 RX ADMIN — MIDODRINE HYDROCHLORIDE 2.5 MG: 2.5 TABLET ORAL at 12:45

## 2023-03-18 RX ADMIN — VANCOMYCIN HYDROCHLORIDE 1000 MG: 1 INJECTION, POWDER, LYOPHILIZED, FOR SOLUTION INTRAVENOUS at 19:06

## 2023-03-18 RX ADMIN — Medication 2 UNITS: at 12:44

## 2023-03-18 RX ADMIN — PIPERACILLIN AND TAZOBACTAM 3.38 G: 3; .375 INJECTION, POWDER, FOR SOLUTION INTRAVENOUS at 12:46

## 2023-03-18 RX ADMIN — VANCOMYCIN HYDROCHLORIDE 1000 MG: 1 INJECTION, POWDER, LYOPHILIZED, FOR SOLUTION INTRAVENOUS at 06:21

## 2023-03-18 RX ADMIN — CEFEPIME 2 G: 2 INJECTION, POWDER, FOR SOLUTION INTRAVENOUS at 05:39

## 2023-03-18 RX ADMIN — MIDODRINE HYDROCHLORIDE 2.5 MG: 2.5 TABLET ORAL at 19:06

## 2023-03-18 RX ADMIN — Medication 3 UNITS: at 21:41

## 2023-03-18 NOTE — PROGRESS NOTES
Pharmacy Medication Reconciliation     The patient's pta meds were reconciled with the current Order [place]y Report provided by [facility]. Allergy Update: Patient has no known allergies. Recommendations/Findings: The following amendments were made to the patient's active medication list on file at [id number]:     1) Additions: all the meds    2) Deletions: na    3) Changes: na    Pertinent Findings: --    -Clarified PTA med list with [facility], [phone]. PTA medication list was corrected to the following:     Prior to Admission Medications   Prescriptions Last Dose Informant Taking? LORazepam (ATIVAN) 0.5 mg tablet 3/18/2023  Yes   Sig: Take 0.5 mg by mouth every four (4) hours as needed for Anxiety. acetaminophen (TYLENOL) 650 mg suppository   Yes   Sig: Insert 650 mg into rectum every four (4) hours as needed for Fever. atorvastatin (LIPITOR) 10 mg tablet   Yes   Sig: Take 10 mg by mouth daily. bisacodyL (DULCOLAX) 10 mg supp   Yes   Sig: Insert 10 mg into rectum daily as needed for Constipation. hyoscyamine SL (LEVSIN/SL) 0.125 mg SL tablet 3/18/2023  Yes   Si.125 mg by SubLINGual route every four (4) hours as needed for PRN Reason (Other) (increased secretions). insulin aspart U-100 (NOVOLOG) 100 unit/mL injection 3/18/2023  Yes   Si Units by SubCUTAneous route Before breakfast, lunch, and dinner. insulin glargine (LANTUS,BASAGLAR) 100 unit/mL (3 mL) inpn 3/18/2023  Yes   Si Units by SubCUTAneous route nightly. midodrine (PROAMATINE) 2.5 mg tablet 3/18/2023  Yes   Sig: Take 2.5 mg by mouth three (3) times daily (with meals). mirtazapine (REMERON) 15 mg tablet   Yes   Sig: Take 15 mg by mouth nightly. For depression   morphine (ROXANOL) 100 mg/5 mL (20 mg/mL) concentrated solution 3/18/2023  Yes   Sig: Take 10 mg by mouth every four (4) hours as needed for Pain.  0.5 ml or 10 mg/dose   oxyCODONE (OXYIR) 5 mg capsule Yes   Sig: Take 5 mg by mouth every four (4) hours as needed for Pain. therapeutic multivitamin-minerals (THERAGRAN-M) tablet   Yes   Sig: Take 1 Tablet by mouth daily.       Facility-Administered Medications: None        Thank you,  JOSE CRUZ Bynum

## 2023-03-18 NOTE — PROGRESS NOTES
1507:  BG 39  1510: BG  36    1519: Pt given 16 g glucose tablet, plus 240 mL apple juice. Took pt 15 minutes to chew the 4 tablets. 1541:  BG 77. Will give pt nelli crackers, peanut butter and apple juice.

## 2023-03-18 NOTE — PROGRESS NOTES
Pharmacy Antimicrobial Kinetic Dosing    Indication for Antimicrobials: Sepsis, UTI, Hx prostate cancer/Nephro tube     Current Regimen of Each Antimicrobial:  Vancomycin 1 gm IV q12h (Start Date 3/17; Day # 2 of 7)  Cefepime 2 gm IV q8h (Start Date 3/17; Day # 2 of 5)    Previous Antimicrobial Therapy:    Goal Level: AUC: 400-600 mg/hr/Liter/day    Date Dose & Interval Measured (mcg/mL) Predicted AUC/MELISSA                       Date & time of next level: Vanc Tr 3/19, 0530    Dosing calculator used: [employer] Code calculator    Significant Cultures:   3/17   Blood. NG. Pending   3/14   Urine: NG. Pending     Conditions for Dosing Consideration: None    Labs:  Recent Labs     23  1238 23  0309   CREA 0.62* 0.74   BUN 10 12     Recent Labs     23  1238 23  1135 23  0309   WBC 18.9* 13.2* 13.0*     Temp (24hrs), Av.5 °F (35.8 °C), Min:93.3 °F (34.1 °C), Max:98.8 °F (37.1 °C)        Creatinine Clearance (mL/min):   CrCl (Adjusted Body Weight): 107.5 mL/min   If actual weight < IBW: CrCl (Actual Body Weight) 92.8    Impression/Plan:   Will continue with Vancomycin 1000 mg IV q 12h for estimated AUC of 481  Continue Cefepime 2 gm IV q8h  Daily BMP   Antimicrobial stop date: Cefepime = 5 days  Vancomycin  = 7 days     Pharmacy will follow daily and adjust medications as appropriate for renal function and/or serum levels.     Thank you,  Valeria Hull, PHARMD

## 2023-03-18 NOTE — PROGRESS NOTES
End of Shift Note    Bedside shift change report given to Edie LOUIS (oncoming nurse) by Shireen Hutchinson RN (offgoing nurse). Report included the following information SBAR, Kardex, Intake/Output, MAR, Recent Results, and Cardiac Rhythm NSR    Shift worked:  Night     Shift summary and any significant changes:     Arrived from the Ed as a new admit; Fully A/O. Non mobile with normal VS. With Nephrostomy tubes bilaterally draining a lot of normal-looking urine. This morning as  A random blood sugar was being done he mentioned that he was light-headed and wondered when b/fast will be served. B/S found to be 48 and 42 from the Lab. MD informed as orders were being carried out. Repeat BS after 15 mins is 207. Concerns for physician to address:       Zone phone for oncoming shift:          Activity:  Activity Level: Bed Rest  Number times ambulated in hallways past shift: 0  Number of times OOB to chair past shift: 0    Cardiac:   Cardiac Monitoring: Yes      Cardiac Rhythm: Sinus Rhythm    Access:  Current line(s): PIV     Genitourinary:   Urinary status: urostomy    Respiratory:   O2 Device: None (Room air)  Chronic home O2 use?: NO  Incentive spirometer at bedside: NO       GI:  Last Bowel Movement Date: 03/16/23  Current diet:  ADULT DIET Regular  ADULT ORAL NUTRITION SUPPLEMENT Breakfast, Lunch, Dinner; Diabetic Supplement  Passing flatus: YES  Tolerating current diet: YES       Pain Management:   Patient states pain is manageable on current regimen: YES    Skin:  Armando Score: 15  Interventions: float heels    Patient Safety:  Fall Score:  Total Score: 1  Interventions: pt to call before getting OOB       Length of Stay:  Expected LOS: - - -  Actual LOS: [address], RN

## 2023-03-18 NOTE — PROGRESS NOTES
Pharmacy Antimicrobial Kinetic Dosing    Indication for Antimicrobials: Sepsis, UTI, Hx prostate cancer/Nephro tube     Current Regimen of Each Antimicrobial:  Vancomycin 1750 mg IV x1 then pharmacy to dose ; Start Date 3/17; Day # 1 of 7  Cefepime 2 gm IV q 8h    Start Date 3/17; Day # 1 of 5    Previous Antimicrobial Therapy:    Goal Level: AUC: 400-600 mg/hr/Liter/day    Date Dose & Interval Measured (mcg/mL) Predicted AUC/MELISSA                       Date & time of next level: within 48hrs    Dosing calculator used: [employer] calculator    Significant Cultures:   3/17   Blood = pending     Urine   = pending    Conditions for Dosing Consideration: None    Labs:  Recent Labs     23  1238   CREA 0.62*   BUN 10     Recent Labs     23  1238 23  1135   WBC 18.9* 13.2*     Temp (24hrs), Av.9 °F (35.5 °C), Min:93.3 °F (34.1 °C), Max:97.7 °F (36.5 °C)        Creatinine Clearance (mL/min):   CrCl (Adjusted Body Weight): 109.3 mL/min   If actual weight < IBW: CrCl (Actual Body Weight) 97.2    Impression/Plan:   Will continue with Vancomycin 1000 mg IV q 12h for estimated AUC of 481  Daily BMP per Vancomycin dosing protocol   Antimicrobial stop date: Cefepime = 5 days  Vancomycin  = 7 days     Pharmacy will follow daily and adjust medications as appropriate for renal function and/or serum levels.     Thank you,  Argelia Perez, [facility]

## 2023-03-18 NOTE — PROGRESS NOTES
Hospitalist Progress Note    Subjective:   Daily Progress Note: 3/18/2023 9:40 AM    Chief Complaint   Patient presents with    Low Blood Sugar     Pt is alert and oriented x 4 with a bgl at triage of 69. Pt able to drink orange juice and answer questions appropriately. Will recheck bgl in 15. Pt had bgl of 31 at facility. EMS treated with D10 drip en route. Altered mental status has improved patient appears to have sepsis with pneumonia and urinary tract infection    Current Facility-Administered Medications   Medication Dose Route Frequency    insulin lispro (HUMALOG) injection   SubCUTAneous TIDAC    [START ON 3/19/2023] Vancomycin trough on 3/19 prior to the 0600 dose. thanks   Other ONCE    sodium chloride (NS) flush 5-10 mL  5-10 mL IntraVENous PRN    cefepime (MAXIPIME) 2 g in 0.9% sodium chloride (MBP/ADV) 100 mL MBP  2 g IntraVENous Q8H    glucose chewable tablet 16 g  4 Tablet Oral PRN    glucagon (GLUCAGEN) injection 1 mg  1 mg IntraMUSCular PRN    dextrose 10% infusion 0-250 mL  0-250 mL IntraVENous PRN    vancomycin (VANCOCIN) 1,000 mg in 0.9% sodium chloride 250 mL ([id number])  1,000 mg IntraVENous Q12H        Review of Systems  Review of Systems   Constitutional:  Positive for malaise/fatigue. Respiratory:  Positive for shortness of breath. Negative for cough. Cardiovascular:  Positive for leg swelling. Negative for chest pain. Gastrointestinal:  Negative for abdominal pain, nausea and vomiting. Genitourinary:  Positive for dysuria. Musculoskeletal: Negative. Neurological:  Positive for weakness. Psychiatric/Behavioral: Negative.             Objective:     Visit Vitals  /66   Pulse 99   Temp 98.8 °F (37.1 °C)   Resp 20   Ht 6' 1\" (1.854 m)   Wt 63.2 kg (139 lb 5.3 oz)   SpO2 100%   BMI 18.38 kg/m²      O2 Device: None (Room air)    Temp (24hrs), Av.5 °F (35.8 °C), Min:93.3 °F (34.1 °C), Max:98.8 °F (37.1 °C)       0701 -  1900  In: -   Out: 330 [Urine:450]  03/16 1901 - 03/18 0700  In: 200 [P.O.:200]  Out: 2275 [Urine:2275]    Recent Results (from the past 24 hour(s))   GLUCOSE, POC    Collection Time: 03/17/23 10:09 AM   Result Value Ref Range    Glucose (POC) 69 65 - 117 mg/dL    Performed by Gaby Chavez \"Rhys\" (TRV RN)    GLUCOSE, POC    Collection Time: 03/17/23 10:33 AM   Result Value Ref Range    Glucose (POC) 99 65 - 117 mg/dL    Performed by Gaby Chavez \"Rhys\" (TRV RN)    CBC WITH AUTOMATED DIFF    Collection Time: 03/17/23 11:35 AM   Result Value Ref Range    WBC 13.2 (H) 4.1 - 11.1 K/uL    RBC 3.17 (L) 4.10 - 5.70 M/uL    HGB 8.0 (L) 12.1 - 17.0 g/dL    HCT 27.6 (L) 36.6 - 50.3 %    MCV 87.1 80.0 - 99.0 FL    MCH 25.2 (L) 26.0 - 34.0 PG    MCHC 29.0 (L) 30.0 - 36.5 g/dL    RDW 21.8 (H) 11.5 - 14.5 %    PLATELET 520 495 - 494 K/uL    MPV 9.8 8.9 - 12.9 FL    NRBC 0.0 0  WBC    ABSOLUTE NRBC 0.00 0.00 - 0.01 K/uL    NEUTROPHILS 89 (H) 32 - 75 %    LYMPHOCYTES 8 (L) 12 - 49 %    MONOCYTES 2 (L) 5 - 13 %    EOSINOPHILS 0 0 - 7 %    BASOPHILS 0 0 - 1 %    IMMATURE GRANULOCYTES 1 (H) 0.0 - 0.5 %    ABS. NEUTROPHILS 11.7 (H) 1.8 - 8.0 K/UL    ABS. LYMPHOCYTES 1.1 0.8 - 3.5 K/UL    ABS. MONOCYTES 0.3 0.0 - 1.0 K/UL    ABS. EOSINOPHILS 0.0 0.0 - 0.4 K/UL    ABS. BASOPHILS 0.0 0.0 - 0.1 K/UL    ABS. IMM.  GRANS. 0.1 (H) 0.00 - 0.04 K/UL    DF SMEAR SCANNED      RBC COMMENTS ANISOCYTOSIS  2+       POC LACTIC ACID    Collection Time: 03/17/23 11:38 AM   Result Value Ref Range    Lactic Acid (POC) 2.35 (HH) 0.40 - 2.00 mmol/L   CULTURE, BLOOD, PAIRED    Collection Time: 03/17/23 12:30 PM    Specimen: Blood   Result Value Ref Range    Special Requests: NO SPECIAL REQUESTS      Culture result: NO GROWTH AFTER 18 HOURS     CBC WITH AUTOMATED DIFF    Collection Time: 03/17/23 12:38 PM   Result Value Ref Range    WBC 18.9 (H) 4.1 - 11.1 K/uL    RBC 3.03 (L) 4.10 - 5.70 M/uL    HGB 7.8 (L) 12.1 - 17.0 g/dL    HCT 26.0 (L) 36.6 - 50.3 %    MCV 85.8 80.0 - 99.0 FL    MCH 25.7 (L) 26.0 - 34.0 PG    MCHC 30.0 30.0 - 36.5 g/dL    RDW 21.8 (H) 11.5 - 14.5 %    PLATELET 701 703 - 332 K/uL    MPV 10.2 8.9 - 12.9 FL    NRBC 0.0 0  WBC    ABSOLUTE NRBC 0.00 0.00 - 0.01 K/uL    NEUTROPHILS 89 (H) 32 - 75 %    LYMPHOCYTES 8 (L) 12 - 49 %    MONOCYTES 2 (L) 5 - 13 %    EOSINOPHILS 0 0 - 7 %    BASOPHILS 0 0 - 1 %    IMMATURE GRANULOCYTES 1 (H) 0.0 - 0.5 %    ABS. NEUTROPHILS 16.8 (H) 1.8 - 8.0 K/UL    ABS. LYMPHOCYTES 1.5 0.8 - 3.5 K/UL    ABS. MONOCYTES 0.4 0.0 - 1.0 K/UL    ABS. EOSINOPHILS 0.0 0.0 - 0.4 K/UL    ABS. BASOPHILS 0.0 0.0 - 0.1 K/UL    ABS. IMM. GRANS. 0.2 (H) 0.00 - 0.04 K/UL    DF SMEAR SCANNED      RBC COMMENTS ANISOCYTOSIS  1+        WBC COMMENTS VACUOLATED POLYS     METABOLIC PANEL, COMPREHENSIVE    Collection Time: 03/17/23 12:38 PM   Result Value Ref Range    Sodium 136 136 - 145 mmol/L    Potassium 4.1 3.5 - 5.1 mmol/L    Chloride 107 97 - 108 mmol/L    CO2 27 21 - 32 mmol/L    Anion gap 2 (L) 5 - 15 mmol/L    Glucose 118 (H) 65 - 100 mg/dL    BUN 10 6 - 20 MG/DL    Creatinine 0.62 (L) 0.70 - 1.30 MG/DL    BUN/Creatinine ratio 16 12 - 20      eGFR >60 >60 ml/min/1.73m2    Calcium 6.9 (L) 8.5 - 10.1 MG/DL    Bilirubin, total 0.3 0.2 - 1.0 MG/DL    ALT (SGPT) 110 (H) 12 - 78 U/L    AST (SGOT) 109 (H) 15 - 37 U/L    Alk.  phosphatase 176 (H) 45 - 117 U/L    Protein, total 5.9 (L) 6.4 - 8.2 g/dL    Albumin 1.3 (L) 3.5 - 5.0 g/dL    Globulin 4.6 (H) 2.0 - 4.0 g/dL    A-G Ratio 0.3 (L) 1.1 - 2.2     URINALYSIS W/ REFLEX CULTURE    Collection Time: 03/17/23  2:30 PM    Specimen: Urine   Result Value Ref Range    Color YELLOW/STRAW      Appearance CLEAR CLEAR      Specific gravity 1.009      pH (UA) 6.5 5.0 - 8.0      Protein Negative NEG mg/dL    Glucose Negative NEG mg/dL    Ketone Negative NEG mg/dL    Bilirubin Negative NEG      Blood TRACE (A) NEG      Urobilinogen 0.2 0.2 - 1.0 EU/dL    Nitrites Negative NEG      Leukocyte Esterase MODERATE (A) NEG WBC 10-20 0 - 4 /hpf    RBC 0-5 0 - 5 /hpf    Epithelial cells FEW FEW /lpf    Bacteria Negative NEG /hpf    UA:UC IF INDICATED URINE CULTURE ORDERED (A) CNI      Budding yeast PRESENT (A) NEG     BLOOD GAS,CHEM8,LACTIC ACID POC    Collection Time: 03/17/23  3:09 PM   Result Value Ref Range    pH, venous (POC) 7.26 (L) 7.32 - 7.42      pCO2, venous (POC) 24.0 (L) 41 - 51 MMHG    pO2, venous (POC) 25 25 - 40 mmHg    BICARBONATE 11 mmol/L    Base deficit (POC) 14.9 mmol/L    O2 SAT 38 %    Sodium,  (H) 136 - 145 MMOL/L    Potassium, POC 1.9 (LL) 3.5 - 5.5 MMOL/L    Chloride,  (H) 100 - 108 MMOL/L    CO2, POC 10 (L) 19 - 24 MMOL/L    Anion gap, POC 20 10 - 20      Glucose, POC 39 (LL) 74 - 106 MG/DL    Creatinine, POC 0.5 (L) 0.6 - 1.3 MG/DL    eGFR (POC) >60 >60 ml/min/1.73m2    Calcium, ionized (POC) 0.53 (LL) 1.12 - 1.32 mmol/L    Lactic Acid (POC) 0.74 0.40 - 2.00 mmol/L    Sample source VENOUS BLOOD     GLUCOSE, POC    Collection Time: 03/17/23  3:14 PM   Result Value Ref Range    Glucose (POC) 92 65 - 117 mg/dL    Performed by Lucero Murguia \"Rhys\" (RAIN RN)    GLUCOSE, POC    Collection Time: 03/17/23  3:16 PM   Result Value Ref Range    Glucose (POC) 99 65 - 117 mg/dL    Performed by Lucero Murguia \"Rhys\" (TRSONJA RN)    GLUCOSE, POC    Collection Time: 03/18/23 12:20 AM   Result Value Ref Range    Glucose (POC) 136 (H) 65 - 117 mg/dL    Performed by Kj Dan PCT    CULTURE, BLOOD    Collection Time: 03/18/23  3:09 AM    Specimen: Blood   Result Value Ref Range    Special Requests: NO SPECIAL REQUESTS      Culture result: NO GROWTH AFTER 4 HOURS     TSH 3RD GENERATION    Collection Time: 03/18/23  3:09 AM   Result Value Ref Range    TSH 0.61 0.36 - 3.74 uIU/mL   CULTURE, BLOOD    Collection Time: 03/18/23  3:30 AM    Specimen: Blood   Result Value Ref Range    Special Requests: NO SPECIAL REQUESTS      Culture result: NO GROWTH AFTER 4 HOURS     URINALYSIS W/ RFLX MICROSCOPIC    Collection Time: 03/18/23  3:30 AM   Result Value Ref Range    Color YELLOW/STRAW      Appearance CLEAR CLEAR      Specific gravity 1.008      pH (UA) 6.5 5.0 - 8.0      Protein 30 (A) NEG mg/dL    Glucose Negative NEG mg/dL    Ketone Negative NEG mg/dL    Bilirubin Negative NEG      Blood MODERATE (A) NEG      Urobilinogen 0.2 0.2 - 1.0 EU/dL    Nitrites Negative NEG      Leukocyte Esterase LARGE (A) NEG      WBC  0 - 4 /hpf    RBC 0-5 0 - 5 /hpf    Epithelial cells FEW FEW /lpf    Bacteria 2+ (A) NEG /hpf   LACTIC ACID    Collection Time: 03/18/23  4:45 AM   Result Value Ref Range    Lactic acid 1.1 0.4 - 2.0 MMOL/L   GLUCOSE, POC    Collection Time: 03/18/23  5:46 AM   Result Value Ref Range    Glucose (POC) 48 (LL) 65 - 117 mg/dL    Performed by Jaquelin Gibson RN    GLUCOSE, POC    Collection Time: 03/18/23  6:44 AM   Result Value Ref Range    Glucose (POC) 207 (H) 65 - 117 mg/dL    Performed by Marescorrine Alfaro (CON)    GLUCOSE, POC    Collection Time: 03/18/23  7:54 AM   Result Value Ref Range    Glucose (POC) 129 (H) 65 - 117 mg/dL    Performed by Luli Ruth         CT HEAD WO CONT   Final Result   No acute intracranial hemorrhage, mass or infarct. CT ABD PELV WO CONT   Final Result      1. LEFT lower lobe pneumonia. There is a small LEFT effusion. 2. Trace amount of free fluid in the pelvis which is nonspecific. No acute   findings are seen in the abdomen or pelvis. 3. Bilateral percutaneous nephrostomy tubes in place. Right-sided double-J   ureteral stent is in satisfactory position. XR CHEST PORT   Final Result   No acute finding or significant change. XR CHEST PORT   Final Result   No Acute Disease. PHYSICAL EXAM:    Physical Exam  Vitals reviewed. HENT:      Head: Normocephalic and atraumatic. Cardiovascular:      Rate and Rhythm: Normal rate and regular rhythm. Heart sounds: Normal heart sounds. Pulmonary:      Breath sounds: Rales present.    Abdominal: General: Abdomen is flat. Bowel sounds are normal.      Palpations: Abdomen is soft. Musculoskeletal:      Cervical back: Normal range of motion and neck supple. Comments: Range of motion limited in the lower extremities unable to move his left leg all the right leg with 2 inches of hip flexion off the bed   Skin:     General: Skin is warm and dry. Comments: Bilateral nephrostomies noted   Neurological:      General: No focal deficit present. Mental Status: He is alert. Mental status is at baseline. He is disoriented. Psychiatric:         Mood and Affect: Mood normal.        Data Review    Recent Results (from the past 24 hour(s))   GLUCOSE, POC    Collection Time: 03/17/23 10:09 AM   Result Value Ref Range    Glucose (POC) 69 65 - 117 mg/dL    Performed by Veneda [place]aburg \"Rhys\" (TRV RN)    GLUCOSE, POC    Collection Time: 03/17/23 10:33 AM   Result Value Ref Range    Glucose (POC) 99 65 - 117 mg/dL    Performed by Veneda [place] \"Rhys\" (TRV RN)    CBC WITH AUTOMATED DIFF    Collection Time: 03/17/23 11:35 AM   Result Value Ref Range    WBC 13.2 (H) 4.1 - 11.1 K/uL    RBC 3.17 (L) 4.10 - 5.70 M/uL    HGB 8.0 (L) 12.1 - 17.0 g/dL    HCT 27.6 (L) 36.6 - 50.3 %    MCV 87.1 80.0 - 99.0 FL    MCH 25.2 (L) 26.0 - 34.0 PG    MCHC 29.0 (L) 30.0 - 36.5 g/dL    RDW 21.8 (H) 11.5 - 14.5 %    PLATELET 209 403 - 501 K/uL    MPV 9.8 8.9 - 12.9 FL    NRBC 0.0 0  WBC    ABSOLUTE NRBC 0.00 0.00 - 0.01 K/uL    NEUTROPHILS 89 (H) 32 - 75 %    LYMPHOCYTES 8 (L) 12 - 49 %    MONOCYTES 2 (L) 5 - 13 %    EOSINOPHILS 0 0 - 7 %    BASOPHILS 0 0 - 1 %    IMMATURE GRANULOCYTES 1 (H) 0.0 - 0.5 %    ABS. NEUTROPHILS 11.7 (H) 1.8 - 8.0 K/UL    ABS. LYMPHOCYTES 1.1 0.8 - 3.5 K/UL    ABS. MONOCYTES 0.3 0.0 - 1.0 K/UL    ABS. EOSINOPHILS 0.0 0.0 - 0.4 K/UL    ABS. BASOPHILS 0.0 0.0 - 0.1 K/UL    ABS. IMM.  GRANS. 0.1 (H) 0.00 - 0.04 K/UL    DF SMEAR SCANNED      RBC COMMENTS ANISOCYTOSIS  2+       POC LACTIC ACID Collection Time: 03/17/23 11:38 AM   Result Value Ref Range    Lactic Acid (POC) 2.35 (HH) 0.40 - 2.00 mmol/L   CULTURE, BLOOD, PAIRED    Collection Time: 03/17/23 12:30 PM    Specimen: Blood   Result Value Ref Range    Special Requests: NO SPECIAL REQUESTS      Culture result: NO GROWTH AFTER 18 HOURS     CBC WITH AUTOMATED DIFF    Collection Time: 03/17/23 12:38 PM   Result Value Ref Range    WBC 18.9 (H) 4.1 - 11.1 K/uL    RBC 3.03 (L) 4.10 - 5.70 M/uL    HGB 7.8 (L) 12.1 - 17.0 g/dL    HCT 26.0 (L) 36.6 - 50.3 %    MCV 85.8 80.0 - 99.0 FL    MCH 25.7 (L) 26.0 - 34.0 PG    MCHC 30.0 30.0 - 36.5 g/dL    RDW 21.8 (H) 11.5 - 14.5 %    PLATELET 671 739 - 933 K/uL    MPV 10.2 8.9 - 12.9 FL    NRBC 0.0 0  WBC    ABSOLUTE NRBC 0.00 0.00 - 0.01 K/uL    NEUTROPHILS 89 (H) 32 - 75 %    LYMPHOCYTES 8 (L) 12 - 49 %    MONOCYTES 2 (L) 5 - 13 %    EOSINOPHILS 0 0 - 7 %    BASOPHILS 0 0 - 1 %    IMMATURE GRANULOCYTES 1 (H) 0.0 - 0.5 %    ABS. NEUTROPHILS 16.8 (H) 1.8 - 8.0 K/UL    ABS. LYMPHOCYTES 1.5 0.8 - 3.5 K/UL    ABS. MONOCYTES 0.4 0.0 - 1.0 K/UL    ABS. EOSINOPHILS 0.0 0.0 - 0.4 K/UL    ABS. BASOPHILS 0.0 0.0 - 0.1 K/UL    ABS. IMM. GRANS. 0.2 (H) 0.00 - 0.04 K/UL    DF SMEAR SCANNED      RBC COMMENTS ANISOCYTOSIS  1+        WBC COMMENTS VACUOLATED POLYS     METABOLIC PANEL, COMPREHENSIVE    Collection Time: 03/17/23 12:38 PM   Result Value Ref Range    Sodium 136 136 - 145 mmol/L    Potassium 4.1 3.5 - 5.1 mmol/L    Chloride 107 97 - 108 mmol/L    CO2 27 21 - 32 mmol/L    Anion gap 2 (L) 5 - 15 mmol/L    Glucose 118 (H) 65 - 100 mg/dL    BUN 10 6 - 20 MG/DL    Creatinine 0.62 (L) 0.70 - 1.30 MG/DL    BUN/Creatinine ratio 16 12 - 20      eGFR >60 >60 ml/min/1.73m2    Calcium 6.9 (L) 8.5 - 10.1 MG/DL    Bilirubin, total 0.3 0.2 - 1.0 MG/DL    ALT (SGPT) 110 (H) 12 - 78 U/L    AST (SGOT) 109 (H) 15 - 37 U/L    Alk.  phosphatase 176 (H) 45 - 117 U/L    Protein, total 5.9 (L) 6.4 - 8.2 g/dL    Albumin 1.3 (L) 3.5 - 5.0 g/dL    Globulin 4.6 (H) 2.0 - 4.0 g/dL    A-G Ratio 0.3 (L) 1.1 - 2.2     URINALYSIS W/ REFLEX CULTURE    Collection Time: 03/17/23  2:30 PM    Specimen: Urine   Result Value Ref Range    Color YELLOW/STRAW      Appearance CLEAR CLEAR      Specific gravity 1.009      pH (UA) 6.5 5.0 - 8.0      Protein Negative NEG mg/dL    Glucose Negative NEG mg/dL    Ketone Negative NEG mg/dL    Bilirubin Negative NEG      Blood TRACE (A) NEG      Urobilinogen 0.2 0.2 - 1.0 EU/dL    Nitrites Negative NEG      Leukocyte Esterase MODERATE (A) NEG      WBC 10-20 0 - 4 /hpf    RBC 0-5 0 - 5 /hpf    Epithelial cells FEW FEW /lpf    Bacteria Negative NEG /hpf    UA:UC IF INDICATED URINE CULTURE ORDERED (A) CNI      Budding yeast PRESENT (A) NEG     BLOOD GAS,CHEM8,LACTIC ACID POC    Collection Time: 03/17/23  3:09 PM   Result Value Ref Range    pH, venous (POC) 7.26 (L) 7.32 - 7.42      pCO2, venous (POC) 24.0 (L) 41 - 51 MMHG    pO2, venous (POC) 25 25 - 40 mmHg    BICARBONATE 11 mmol/L    Base deficit (POC) 14.9 mmol/L    O2 SAT 38 %    Sodium,  (H) 136 - 145 MMOL/L    Potassium, POC 1.9 (LL) 3.5 - 5.5 MMOL/L    Chloride,  (H) 100 - 108 MMOL/L    CO2, POC 10 (L) 19 - 24 MMOL/L    Anion gap, POC 20 10 - 20      Glucose, POC 39 (LL) 74 - 106 MG/DL    Creatinine, POC 0.5 (L) 0.6 - 1.3 MG/DL    eGFR (POC) >60 >60 ml/min/1.73m2    Calcium, ionized (POC) 0.53 (LL) 1.12 - 1.32 mmol/L    Lactic Acid (POC) 0.74 0.40 - 2.00 mmol/L    Sample source VENOUS BLOOD     GLUCOSE, POC    Collection Time: 03/17/23  3:14 PM   Result Value Ref Range    Glucose (POC) 92 65 - 117 mg/dL    Performed by Anna Orona \"Rhys\" (TRSONJA RN)    GLUCOSE, POC    Collection Time: 03/17/23  3:16 PM   Result Value Ref Range    Glucose (POC) 99 65 - 117 mg/dL    Performed by Anna Orona \"Rhys\" (RAIN RN)    GLUCOSE, POC    Collection Time: 03/18/23 12:20 AM   Result Value Ref Range    Glucose (POC) 136 (H) 65 - 117 mg/dL    Performed by Ermias NUNEZ CULTURE, BLOOD    Collection Time: 03/18/23  3:09 AM    Specimen: Blood   Result Value Ref Range    Special Requests: NO SPECIAL REQUESTS      Culture result: NO GROWTH AFTER 4 HOURS     TSH 3RD GENERATION    Collection Time: 03/18/23  3:09 AM   Result Value Ref Range    TSH 0.61 0.36 - 3.74 uIU/mL   CULTURE, BLOOD    Collection Time: 03/18/23  3:30 AM    Specimen: Blood   Result Value Ref Range    Special Requests: NO SPECIAL REQUESTS      Culture result: NO GROWTH AFTER 4 HOURS     URINALYSIS W/ RFLX MICROSCOPIC    Collection Time: 03/18/23  3:30 AM   Result Value Ref Range    Color YELLOW/STRAW      Appearance CLEAR CLEAR      Specific gravity 1.008      pH (UA) 6.5 5.0 - 8.0      Protein 30 (A) NEG mg/dL    Glucose Negative NEG mg/dL    Ketone Negative NEG mg/dL    Bilirubin Negative NEG      Blood MODERATE (A) NEG      Urobilinogen 0.2 0.2 - 1.0 EU/dL    Nitrites Negative NEG      Leukocyte Esterase LARGE (A) NEG      WBC  0 - 4 /hpf    RBC 0-5 0 - 5 /hpf    Epithelial cells FEW FEW /lpf    Bacteria 2+ (A) NEG /hpf   LACTIC ACID    Collection Time: 03/18/23  4:45 AM   Result Value Ref Range    Lactic acid 1.1 0.4 - 2.0 MMOL/L   GLUCOSE, POC    Collection Time: 03/18/23  5:46 AM   Result Value Ref Range    Glucose (POC) 48 (LL) 65 - 117 mg/dL    Performed by Jassi Chi RN    GLUCOSE, POC    Collection Time: 03/18/23  6:44 AM   Result Value Ref Range    Glucose (POC) 207 (H) 65 - 117 mg/dL    Performed by Elex Leisure Sincere (CON)    GLUCOSE, POC    Collection Time: 03/18/23  7:54 AM   Result Value Ref Range    Glucose (POC) 129 (H) 65 - 117 mg/dL    Performed by Cisco Gar         Assessment/Plan:     Active Problems:    Sepsis ([address] (3/17/2023)    Hospital course: This is a 60-year-old male admitted on 3/17/2023 with sepsis and white count of 18,000. Patient has bilateral nephrostomies and urine was consistent with leukoesterase. Patient also has left lower lobe pneumonia.   Patient presented with hypoglycemia most likely secondary to his sepsis. Started on cefepime. Discussed case with patient's next of kin Maura Serrato who says patient is supposed to be in hospice. Patient is a diabetic although does not know his medications. Previous they placed on long-acting insulin. Patient was also on Eliquis for unknown reasons. We will get further clarification of hospice. Impression\plan:    1. Sepsis with pulmonary and urinary origin  Left lower lobe pneumonia  Change cefepime to Zosyn   Follow-up on urine cultures  Follow-up on blood cultures  CT scan of the abdomen pelvis revealed nephrostomies with thickened bladder and may require Campos catheter for source control    2. Diabetes mellitus with hypoglycemia  Patient continues to have hypoglycemia  D5 half-normal saline  Sliding scale insulin    3. Metastatic prostate cancer  Supposedly on hospice  We will need further clarification with case management  Continue to monitor previous PSA was 79 on therapy    4. Elevated transaminases  Previous LFTs were normal  Continue to monitor  CT showed no obvious liver abnormalities. Continue to monitor  Ultrasound of the right upper quadrant    5. Left leg paresis  Most likely from metastatic prostate cancer  Previous surgery for pain control    6. Septic encephalitis  Follow-up on cultures  CT of the head with no acute process  Improving    CODE STATUS: Full code although was in hospice will clarify    DVT prophylaxis: Lovenox  Was on Eliquis at some point for unknown    Discharge disposition: Resolution of sepsis    Discussed case with next of kin Ms. Ann via phone    Medical Decision Making:    Labs reviewed by myself   White count elevated 18,000    Diagnostic data reviewed by myself   CT of the abdomen pelvis with bilateral nephrostomies    Toxic drug monitoring    None    Discussed case with   Nursing home provider    [facility] Discussion   36-year-old male with sepsis, bilateral nephrostomies left lower lung pneumonia currently on Zosyn who may be in hospice and will need further clarification from the nursing home. Will discuss with nursing home provider     Care Plan discussed with: Patient/Family    Total time spent with patient: 45 minutes.

## 2023-03-18 NOTE — PROGRESS NOTES
Spiritual Care Assessment/Progress Note  [facility]      NAME: Ruthy Miranda      MRN: [id number]  AGE: 78 y.o. SEX: male  Yazidi Affiliation: Holiness   Language: English     3/18/2023     Total Time (in minutes): 13     Spiritual Assessment begun in [facility] through conversation with:         []Patient        [] Family    [] Friend(s)        Reason for Consult: Initial/Spiritual assessment, patient floor     Spiritual beliefs: (Please include comment if needed)     [] Identifies with a venancio tradition:         [] Supported by a venancio community:            [] Claims no spiritual orientation:           [] Seeking spiritual identity:                [] Adheres to an individual form of spirituality:           [x] Not able to assess:                           Identified resources for coping:      [] Prayer                               [] Music                  [] Guided Imagery     [] Family/friends                 [] Pet visits     [] Devotional reading                         [x] Unknown     [] Other:                                                Interventions offered during this visit: (See comments for more details)    Patient Interventions: Initial visit           Plan of Care:     [x] Support spiritual and/or cultural needs    [] Support AMD and/or advance care planning process      [] Support grieving process   [] Coordinate Rites and/or Rituals    [] Coordination with community clergy   [] No spiritual needs identified at this time   [] Detailed Plan of Care below (See Comments)  [] Make referral to Music Therapy  [] Make referral to Pet Therapy     [] Make referral to Addiction services  [] Make referral to [facility]  [] Make referral to Spiritual Care Partner  [] No future visits requested        [x] Contact Spiritual Care for further referrals     Comments:   Reviewed chart prior to visit on Oncology unit for spiritual assessment.   Physician was at bedside speaking with patient. Unable to assess for spiritual needs or concerns at this time.    available upon referral by staff or by patient/family request.       YRN Ni, [facility], Staff   EMMA BURNETTE [facility] Paging Service  [phone])

## 2023-03-18 NOTE — PROGRESS NOTES
Nutrition Note    Chart reviewed due to MST triggering for wt loss and poor appetite, as well as low BMI. Hospice consult noted. Supplements ordered TID. Will hold off on comprehensive nutrition assessment given hospice consult. Continue ONS TID given hypoglycemia. Will back in Monday if pt is still here.      Electronically signed by Sachin Muñoz RD, [address]  on 3/18/2023 at 4:10 PM    Contact: ext [phone]

## 2023-03-19 LAB
ALBUMIN SERPL-MCNC: 1.3 G/DL (ref 3.5–5)
ALBUMIN/GLOB SERPL: 0.3 (ref 1.1–2.2)
ALP SERPL-CCNC: 168 U/L (ref 45–117)
ALT SERPL-CCNC: 123 U/L (ref 12–78)
ANION GAP SERPL CALC-SCNC: 3 MMOL/L (ref 5–15)
AST SERPL-CCNC: 99 U/L (ref 15–37)
ATRIAL RATE: 86 BPM
BASOPHILS # BLD: 0.1 K/UL (ref 0–0.1)
BASOPHILS NFR BLD: 1 % (ref 0–1)
BILIRUB SERPL-MCNC: 0.2 MG/DL (ref 0.2–1)
BUN SERPL-MCNC: 13 MG/DL (ref 6–20)
BUN/CREAT SERPL: 17 (ref 12–20)
CALCIUM SERPL-MCNC: 7.5 MG/DL (ref 8.5–10.1)
CALCULATED P AXIS, ECG09: 41 DEGREES
CALCULATED R AXIS, ECG10: -15 DEGREES
CALCULATED T AXIS, ECG11: 31 DEGREES
CHLORIDE SERPL-SCNC: 106 MMOL/L (ref 97–108)
CO2 SERPL-SCNC: 26 MMOL/L (ref 21–32)
CREAT SERPL-MCNC: 0.77 MG/DL (ref 0.7–1.3)
DATE LAST DOSE: ABNORMAL
DIAGNOSIS, 93000: NORMAL
DIFFERENTIAL METHOD BLD: ABNORMAL
EOSINOPHIL # BLD: 0 K/UL (ref 0–0.4)
EOSINOPHIL NFR BLD: 0 % (ref 0–7)
ERYTHROCYTE [DISTWIDTH] IN BLOOD BY AUTOMATED COUNT: 21.3 % (ref 11.5–14.5)
EST. AVERAGE GLUCOSE BLD GHB EST-MCNC: 246 MG/DL
GLOBULIN SER CALC-MCNC: 4.7 G/DL (ref 2–4)
GLUCOSE BLD STRIP.AUTO-MCNC: 173 MG/DL (ref 65–117)
GLUCOSE BLD STRIP.AUTO-MCNC: 178 MG/DL (ref 65–117)
GLUCOSE BLD STRIP.AUTO-MCNC: 181 MG/DL (ref 65–117)
GLUCOSE BLD STRIP.AUTO-MCNC: 187 MG/DL (ref 65–117)
GLUCOSE BLD STRIP.AUTO-MCNC: 198 MG/DL (ref 65–117)
GLUCOSE BLD STRIP.AUTO-MCNC: 206 MG/DL (ref 65–117)
GLUCOSE SERPL-MCNC: 193 MG/DL (ref 65–100)
HBA1C MFR BLD: 10.2 % (ref 4–5.6)
HCT VFR BLD AUTO: 21.9 % (ref 36.6–50.3)
HCT VFR BLD AUTO: 22.6 % (ref 36.6–50.3)
HGB BLD-MCNC: 6.5 G/DL (ref 12.1–17)
HGB BLD-MCNC: 6.8 G/DL (ref 12.1–17)
HISTORY CHECKED?,CKHIST: NORMAL
IMM GRANULOCYTES # BLD AUTO: 0.1 K/UL (ref 0–0.04)
IMM GRANULOCYTES NFR BLD AUTO: 1 % (ref 0–0.5)
LYMPHOCYTES # BLD: 2.5 K/UL (ref 0.8–3.5)
LYMPHOCYTES NFR BLD: 24 % (ref 12–49)
MCH RBC QN AUTO: 25.3 PG (ref 26–34)
MCHC RBC AUTO-ENTMCNC: 29.7 G/DL (ref 30–36.5)
MCV RBC AUTO: 85.2 FL (ref 80–99)
MONOCYTES # BLD: 0.5 K/UL (ref 0–1)
MONOCYTES NFR BLD: 5 % (ref 5–13)
NEUTS SEG # BLD: 7.3 K/UL (ref 1.8–8)
NEUTS SEG NFR BLD: 69 % (ref 32–75)
NRBC # BLD: 0 K/UL (ref 0–0.01)
NRBC BLD-RTO: 0 PER 100 WBC
P-R INTERVAL, ECG05: 142 MS
PLATELET # BLD AUTO: 309 K/UL (ref 150–400)
PMV BLD AUTO: 9.4 FL (ref 8.9–12.9)
POTASSIUM SERPL-SCNC: 4.3 MMOL/L (ref 3.5–5.1)
PROT SERPL-MCNC: 6 G/DL (ref 6.4–8.2)
Q-T INTERVAL, ECG07: 338 MS
QRS DURATION, ECG06: 64 MS
QTC CALCULATION (BEZET), ECG08: 404 MS
RBC # BLD AUTO: 2.57 M/UL (ref 4.1–5.7)
RBC MORPH BLD: ABNORMAL
RBC MORPH BLD: ABNORMAL
REPORTED DOSE,DOSE: ABNORMAL UNITS
REPORTED DOSE/TIME,TMG: 1800
SERVICE CMNT-IMP: ABNORMAL
SODIUM SERPL-SCNC: 135 MMOL/L (ref 136–145)
VANCOMYCIN TROUGH SERPL-MCNC: 19.5 UG/ML (ref 5–10)
VENTRICULAR RATE, ECG03: 86 BPM
WBC # BLD AUTO: 10.5 K/UL (ref 4.1–11.1)

## 2023-03-19 PROCEDURE — 86923 COMPATIBILITY TEST ELECTRIC: CPT

## 2023-03-19 PROCEDURE — 74011636637 HC RX REV CODE- 636/637: Performed by: PHYSICIAN ASSISTANT

## 2023-03-19 PROCEDURE — 74011250637 HC RX REV CODE- 250/637: Performed by: PHYSICIAN ASSISTANT

## 2023-03-19 PROCEDURE — 65270000046 HC RM TELEMETRY

## 2023-03-19 PROCEDURE — 74011250636 HC RX REV CODE- 250/636: Performed by: NURSE PRACTITIONER

## 2023-03-19 PROCEDURE — P9016 RBC LEUKOCYTES REDUCED: HCPCS

## 2023-03-19 PROCEDURE — 80202 ASSAY OF VANCOMYCIN: CPT

## 2023-03-19 PROCEDURE — 85018 HEMOGLOBIN: CPT

## 2023-03-19 PROCEDURE — 74011250636 HC RX REV CODE- 250/636: Performed by: PHYSICIAN ASSISTANT

## 2023-03-19 PROCEDURE — 36415 COLL VENOUS BLD VENIPUNCTURE: CPT

## 2023-03-19 PROCEDURE — 36430 TRANSFUSION BLD/BLD COMPNT: CPT

## 2023-03-19 PROCEDURE — 74011000250 HC RX REV CODE- 250: Performed by: PHYSICIAN ASSISTANT

## 2023-03-19 PROCEDURE — 82962 GLUCOSE BLOOD TEST: CPT

## 2023-03-19 PROCEDURE — 86850 RBC ANTIBODY SCREEN: CPT

## 2023-03-19 PROCEDURE — 80053 COMPREHEN METABOLIC PANEL: CPT

## 2023-03-19 PROCEDURE — 74011000258 HC RX REV CODE- 258: Performed by: PHYSICIAN ASSISTANT

## 2023-03-19 PROCEDURE — 74011250636 HC RX REV CODE- 250/636: Performed by: INTERNAL MEDICINE

## 2023-03-19 PROCEDURE — 83036 HEMOGLOBIN GLYCOSYLATED A1C: CPT

## 2023-03-19 PROCEDURE — 85025 COMPLETE CBC W/AUTO DIFF WBC: CPT

## 2023-03-19 RX ORDER — SODIUM CHLORIDE 9 MG/ML
250 INJECTION, SOLUTION INTRAVENOUS AS NEEDED
Status: DISCONTINUED | OUTPATIENT
Start: 2023-03-19 | End: 2023-03-22 | Stop reason: HOSPADM

## 2023-03-19 RX ADMIN — Medication 2 UNITS: at 18:01

## 2023-03-19 RX ADMIN — MIDODRINE HYDROCHLORIDE 2.5 MG: 2.5 TABLET ORAL at 08:56

## 2023-03-19 RX ADMIN — OXYCODONE HYDROCHLORIDE 5 MG: 5 SOLUTION ORAL at 18:08

## 2023-03-19 RX ADMIN — ENOXAPARIN SODIUM 40 MG: 100 INJECTION SUBCUTANEOUS at 12:39

## 2023-03-19 RX ADMIN — PIPERACILLIN AND TAZOBACTAM 3.38 G: 3; .375 INJECTION, POWDER, FOR SOLUTION INTRAVENOUS at 18:02

## 2023-03-19 RX ADMIN — Medication 2 UNITS: at 08:55

## 2023-03-19 RX ADMIN — VANCOMYCIN HYDROCHLORIDE 1000 MG: 1 INJECTION, POWDER, LYOPHILIZED, FOR SOLUTION INTRAVENOUS at 06:21

## 2023-03-19 RX ADMIN — DEXTROSE AND SODIUM CHLORIDE 50 ML/HR: 5; 450 INJECTION, SOLUTION INTRAVENOUS at 11:16

## 2023-03-19 RX ADMIN — Medication 2 UNITS: at 12:38

## 2023-03-19 RX ADMIN — PIPERACILLIN AND TAZOBACTAM 3.38 G: 3; .375 INJECTION, POWDER, FOR SOLUTION INTRAVENOUS at 02:17

## 2023-03-19 RX ADMIN — VANCOMYCIN HYDROCHLORIDE 750 MG: 750 INJECTION, POWDER, LYOPHILIZED, FOR SOLUTION INTRAVENOUS at 22:35

## 2023-03-19 RX ADMIN — MIDODRINE HYDROCHLORIDE 2.5 MG: 2.5 TABLET ORAL at 12:38

## 2023-03-19 RX ADMIN — PIPERACILLIN AND TAZOBACTAM 3.38 G: 3; .375 INJECTION, POWDER, FOR SOLUTION INTRAVENOUS at 12:39

## 2023-03-19 RX ADMIN — MIDODRINE HYDROCHLORIDE 2.5 MG: 2.5 TABLET ORAL at 18:01

## 2023-03-19 NOTE — PROGRESS NOTES
Pharmacy Antimicrobial Kinetic Dosing    Indication for Antimicrobials: Sepsis, UTI, Hx prostate cancer/Nephro tube     Current Regimen of Each Antimicrobial:  Vancomycin 1 gm IV q12h (Start Date 3/17; Day 3 of 7)  Zosyn 3.375 gm IV q8h (Start date 3/18; day 2)    Previous Antimicrobial Therapy:  Cefepime 2 gm IV q8h (Start Date 3/17; Day 2)    Goal Level: Vancomycin AUC: 400-600 mg/hr/Liter/day    Date Dose & Interval Measured (mcg/mL) Predicted AUC/MELISSA   3/19 1 gm IV q12h 19.5 676                 Date & time of next level: recheck tr on 3/21    Dosing calculator used: [employer] calculator    Significant Cultures:   3/17   Blood. NG. Pending   3/14   Urine: NG. Final    Nares MRSA? Conditions for Dosing Consideration: None    Labs:  Recent Labs     23  0524 23  1238 23  0309   CREA 0.77 0.62* 0.74   BUN 13 10 12     Recent Labs     23  0524 23  1238 23  1135 23  0309   WBC 10.5 18.9* 13.2* 13.0*     Temp (24hrs), Av.4 °F (36.9 °C), Min:98.2 °F (36.8 °C), Max:98.6 °F (37 °C)        Creatinine Clearance (mL/min):   CrCl (Adjusted Body Weight): 97.7 mL/min   If actual weight < IBW: CrCl (Actual Body Weight) 84.4    Impression/Plan:   Vancomycin trough is therapeutic but will accumulate with and expected AUC >600. Decrease Vancomycin to 750 mg IV q 12h for estimated AUC of 514  ABX coverage extended to Zosyn 3.375 gm IV q8h (DC Cefepime)  Daily BMP   Antimicrobial stop date: Vancomycin 7 days, Zosyn TBD     Pharmacy will follow daily and adjust medications as appropriate for renal function and/or serum levels.     Thank you,  Boy Walker, PHARMD

## 2023-03-19 NOTE — PROGRESS NOTES
End of Shift Note    Bedside shift change report given to Edie (oncoming nurse) by Macy Sargent (offgoing nurse). Report included the following information SBAR, Kardex, and MAR    Shift worked:  7p-7a     Shift summary and any significant changes:     Scheduled medications were given, see MAR.  IV's were flushed and are patent. Patient had a small bowel movement. Patient was repositioned during my shift. Morning labs were done, HgB was 6.5 and JONELLE Guzmán was informed accordingly. Order was received to do a redraw of his HgB lab. Patient teaching and routine rounding has been done. Concerns for physician to address:       Zone phone for oncoming shift:          Activity:  Activity Level: Bed Rest  Number times ambulated in hallways past shift: 0  Number of times OOB to chair past shift: 0    Cardiac:   Cardiac Monitoring: Yes      Cardiac Rhythm: Sinus Tachy    Access:  Current line(s): PIV     Genitourinary:   Urinary status: incontinent    Respiratory:   O2 Device: None (Room air)  Chronic home O2 use?: NO  Incentive spirometer at bedside: NO       GI:  Last Bowel Movement Date: 03/19/23  Current diet:  ADULT DIET Regular  ADULT ORAL NUTRITION SUPPLEMENT Breakfast, Lunch, Dinner; Diabetic Supplement  DIET ONE TIME MESSAGE  Passing flatus: YES  Tolerating current diet: YES       Pain Management:   Patient states pain is manageable on current regimen: YES    Skin:  Armando Score: 16  Interventions: nutritional support     Patient Safety:  Fall Score:  Total Score: 1  Interventions: bed/chair alarm       Length of Stay:  Expected LOS: - - -  Actual LOS: 2      Macy Sargent

## 2023-03-19 NOTE — PROGRESS NOTES
End of Shift Note    Bedside shift change report given to Rj Jiménez RN (oncoming nurse) by Andrei Henderson RN (offgoing nurse). Report included the following information SBAR, Kardex, Intake/Output, and MAR    Shift worked:  5091-4134     Shift summary and any significant changes:     Patient tolerated care fairly well, no complaints of pain. All scheduled meds, pt education and frequent rounding provided. Pt NPO after breakfast for scheduled US ABD, at 1500 pt stated he wasn't feeling well, a little lightheaded. BG at 1507 was 39; BG at 1510 was 36. Gave 16 g glucose tablets and 240 mL of apple juice. At 1541 BG 77. (Documented in previous note)  At 1727 . Pt's nephrostomies tubes draining well, left drain output greater than right. Output documented in chart.      Concerns for physician to address:  Blood glucose     Zone phone for oncoming shift:            Andrei Henderson, RN

## 2023-03-19 NOTE — PROGRESS NOTES
:Transition of Care Plan:    RUR:13    GLOS:   LOS: 2     RADHA: 3/20    Disposition: Plan to return to [facility] with [facility]. 9:10 AM  CM talked to Theresa and they are currently open to Pt and they are aware Pt is in the hospital.  CM will send them updates via [employer]. 8:41 AM  CM called Pt DTR, Isabel Ripa: [phone] and she indicated that he was a resident at [facility]. She thinks he was open to [facility]. CM called [facility]: [phone] and they are going to have sherry RN call me back. CM called Skip Silvestre with A and she is going to call Tj and confirm Pt is LTC resident at [facility] and whether or not they can take him back and when. Skip Silvestre will call me back. If SNF or IPR: Date FOC offered:  Date FOC received:  Date authorization started with reference number:  Date authorization received and expires:  Accepting facility:    Follow up [place] [address] Director. DME needed: n/a   Transportation at Discharge: BLS will need to be delivered  [address] or means to access home:         Medicare Letter: n/a Medicaid  Is patient a [place] and connected with the [place]?            no    If yes, was [employer] transfer form completed and [place] notified? Caregiver Contact: DTR: Isabel Ripa: [phone]  Discharge Caregiver contacted prior to discharge? yes  Care Conference needed?:               no    Reason for Admission:  Pt was admitted on 3/17 d/t  Sepsis s/t to UTI.                       RUR Score:          13           Plan for utilizing home health:     n/a     PCP: First and Last name:  None Medical Director at [facility]     Name of Practice:    Are you a current patient: Yes/No:    Approximate date of last visit:    Can you participate in a virtual visit with your PCP:                     Current Advanced Directive/Advance Care Plan: Full Code      Healthcare Decision Maker: Yuri Poisson: [phone]    Deisy  Pt lives at [address] [place] [address] at [facility]. Will need BLS transport once ready for DC. CM will continue to monitor discharge plan. Care Management Interventions  PCP Verified by CM: Yes  Palliative Care Criteria Met (RRAT>21 & CHF Dx)?: No  Mode of Transport at Discharge:  Other (see comment)  Transition of Care Consult (CM Consult): [address]: No  Reason Outside [place]: Patient already serviced by other home care/hospice agency  MyChart Signup: No  Discharge Durable Medical Equipment: No  Physical Therapy Consult: No  Occupational Therapy Consult: No  Speech Therapy Consult: No  Support Systems: Child(roderick), [address] Follow Up Transport: Other (see comment)  The Plan for Transition of Care is Related to the Following Treatment Goals : LTC: [place]  Discharge Location  Patient Expects to be Discharged to[de-identified] [address] [facility], [address] [facility] [address]

## 2023-03-19 NOTE — PROGRESS NOTES
Hospitalist Progress Note    Subjective:   Daily Progress Note: 3/19/2023 10:22 AM    Hospital Course: This is a 60-year-old male admitted on 3/17/2023 with sepsis and white count of 18,000. Patient has bilateral nephrostomies and urine was consistent with leukoesterase. Patient also has left lower lobe pneumonia by CT scan. Patient presented with hypoglycemia most likely secondary to his sepsis. Started on cefepime. Discussed case with patient's next of kin Miryam Pillai who says patient is supposed to be in hospice. Patient is a diabetic although does not know his medications. Previous they placed on long-acting insulin. Patient was also on Eliquis for unknown reasons. We will get further clarification of hospice. He lives at [facility] and uses [facility] hospice at the facility. He is currently a full code. Subjective:  Pt seen in room, no complaints of pain. No nausea or vomiting.      Current Facility-Administered Medications   Medication Dose Route Frequency    0.9% sodium chloride infusion 250 mL  250 mL IntraVENous PRN    piperacillin-tazobactam (ZOSYN) 3.375 g in 0.9% sodium chloride (MBP/ADV) 100 mL MBP  3.375 g IntraVENous Q8H    dextrose 5 % - 0.45% NaCl infusion  50 mL/hr IntraVENous CONTINUOUS    insulin lispro (HUMALOG) injection   SubCUTAneous AC&HS    enoxaparin (LOVENOX) injection 40 mg  40 mg SubCUTAneous Q24H    LORazepam (ATIVAN) tablet 0.5 mg  0.5 mg Oral Q4H PRN    midodrine (PROAMATINE) tablet 2.5 mg  2.5 mg Oral TID WITH MEALS    oxyCODONE (ROXICODONE) 5 mg/5 mL oral solution 5 mg  5 mg Oral Q4H PRN    glucagon (GLUCAGEN) injection 1 mg  1 mg IntraMUSCular PRN    glucose chewable tablet 16 g  16 g Oral PRN    ondansetron (ZOFRAN) injection 4 mg  4 mg IntraVENous Q6H PRN    melatonin tablet 3 mg  3 mg Oral QHS PRN    diphenhydrAMINE (BENADRYL) injection 25 mg  25 mg IntraVENous Q6H PRN    polyethylene glycol (MIRALAX) packet 17 g  17 g Oral DAILY PRN    sodium chloride (NS) flush 5-10 mL  5-10 mL IntraVENous PRN    dextrose 10% infusion 0-250 mL  0-250 mL IntraVENous PRN    vancomycin (VANCOCIN) 1,000 mg in 0.9% sodium chloride 250 mL ([id number])  1,000 mg IntraVENous Q12H        Review of Systems:    Review of Systems   Constitutional:  Negative for fever and malaise/fatigue. Respiratory:  Negative for cough and shortness of breath. Cardiovascular:  Negative for chest pain and palpitations. Gastrointestinal:  Negative for abdominal pain, heartburn, nausea and vomiting. Genitourinary:  Negative for dysuria and urgency. Neurological:  Negative for dizziness and headaches. Objective:     Visit Vitals  /66   Pulse 95   Temp 98.4 °F (36.9 °C)   Resp 17   Ht 6' 1\" (1.854 m)   Wt 63.2 kg (139 lb 5.3 oz)   SpO2 100%   BMI 18.38 kg/m²      O2 Device: None (Room air)    Temp (24hrs), Av.4 °F (36.9 °C), Min:98.2 °F (36.8 °C), Max:98.6 °F (37 °C)       07 -  190  In: -   Out: 066 [Urine:575]  1901 -  0700  In: 200 [P.O.:200]  Out: 3876 [Urine:5325]    PHYSICAL EXAM:    Physical Exam  Constitutional:       General: He is not in acute distress. Comments: Thin, cachetic    Cardiovascular:      Rate and Rhythm: Normal rate and regular rhythm. Pulses: Normal pulses. Heart sounds: Normal heart sounds. Pulmonary:      Effort: Pulmonary effort is normal.      Breath sounds: Normal breath sounds. Abdominal:      General: Bowel sounds are normal.      Palpations: Abdomen is soft. Musculoskeletal:         General: Deformity present. Comments: Left leg no movement, d/t damage to hip joint, secondary to advanced prostate cancer. Skin:     General: Skin is warm and dry. Comments: Bilateral nephrostomy tubes,dark zachariah urine   Neurological:      Mental Status: He is oriented to person, place, and time.    Psychiatric:         Mood and Affect: Mood normal.         Behavior: Behavior normal.          Data Review    Recent Results (from the past 24 hour(s))   GLUCOSE, POC    Collection Time: 03/18/23 11:16 AM   Result Value Ref Range    Glucose (POC) 151 (H) 65 - 117 mg/dL    Performed by Hayde Blackman    GLUCOSE, POC    Collection Time: 03/18/23  3:07 PM   Result Value Ref Range    Glucose (POC) 39 (LL) 65 - 117 mg/dL    Performed by [employer], POC    Collection Time: 03/18/23  3:10 PM   Result Value Ref Range    Glucose (POC) 36 (LL) 65 - 117 mg/dL    Performed by [employer], POC    Collection Time: 03/18/23  3:41 PM   Result Value Ref Range    Glucose (POC) 77 65 - 117 mg/dL    Performed by Jd Diaz RN    GLUCOSE, POC    Collection Time: 03/18/23  5:27 PM   Result Value Ref Range    Glucose (POC) 126 (H) 65 - 117 mg/dL    Performed by Jd Diaz RN    GLUCOSE, POC    Collection Time: 03/18/23  9:05 PM   Result Value Ref Range    Glucose (POC) 295 (H) 65 - 117 mg/dL    Performed by Savanah Dorantes PCT    GLUCOSE, POC    Collection Time: 03/19/23  2:15 AM   Result Value Ref Range    Glucose (POC) 206 (H) 65 - 117 mg/dL    Performed by Michael Nunez, TROUGH    Collection Time: 03/19/23  5:24 AM   Result Value Ref Range    Vancomycin,trough 19.5 (H) 5.0 - 10.0 ug/mL    Reported dose date 20230318      Reported dose time: 1800      Reported dose: 1 GM UNITS   CBC WITH AUTOMATED DIFF    Collection Time: 03/19/23  5:24 AM   Result Value Ref Range    WBC 10.5 4.1 - 11.1 K/uL    RBC 2.57 (L) 4.10 - 5.70 M/uL    HGB 6.5 (L) 12.1 - 17.0 g/dL    HCT 21.9 (L) 36.6 - 50.3 %    MCV 85.2 80.0 - 99.0 FL    MCH 25.3 (L) 26.0 - 34.0 PG    MCHC 29.7 (L) 30.0 - 36.5 g/dL    RDW 21.3 (H) 11.5 - 14.5 %    PLATELET 305 366 - 936 K/uL    MPV 9.4 8.9 - 12.9 FL    NRBC 0.0 0  WBC    ABSOLUTE NRBC 0.00 0.00 - 0.01 K/uL    NEUTROPHILS 69 32 - 75 %    LYMPHOCYTES 24 12 - 49 %    MONOCYTES 5 5 - 13 %    EOSINOPHILS 0 0 - 7 %    BASOPHILS 1 0 - 1 %    IMMATURE GRANULOCYTES 1 (H) 0.0 - 0.5 %    ABS. NEUTROPHILS 7.3 1.8 - 8.0 K/UL    ABS. LYMPHOCYTES 2.5 0.8 - 3.5 K/UL    ABS. MONOCYTES 0.5 0.0 - 1.0 K/UL    ABS. EOSINOPHILS 0.0 0.0 - 0.4 K/UL    ABS. BASOPHILS 0.1 0.0 - 0.1 K/UL    ABS. IMM. GRANS. 0.1 (H) 0.00 - 0.04 K/UL    DF SMEAR SCANNED      RBC COMMENTS ANISOCYTOSIS  1+        RBC COMMENTS RBC FRAGMENTS     METABOLIC PANEL, COMPREHENSIVE    Collection Time: 03/19/23  5:24 AM   Result Value Ref Range    Sodium 135 (L) 136 - 145 mmol/L    Potassium 4.3 3.5 - 5.1 mmol/L    Chloride 106 97 - 108 mmol/L    CO2 26 21 - 32 mmol/L    Anion gap 3 (L) 5 - 15 mmol/L    Glucose 193 (H) 65 - 100 mg/dL    BUN 13 6 - 20 MG/DL    Creatinine 0.77 0.70 - 1.30 MG/DL    BUN/Creatinine ratio 17 12 - 20      eGFR >60 >60 ml/min/1.73m2    Calcium 7.5 (L) 8.5 - 10.1 MG/DL    Bilirubin, total 0.2 0.2 - 1.0 MG/DL    ALT (SGPT) 123 (H) 12 - 78 U/L    AST (SGOT) 99 (H) 15 - 37 U/L    Alk. phosphatase 168 (H) 45 - 117 U/L    Protein, total 6.0 (L) 6.4 - 8.2 g/dL    Albumin 1.3 (L) 3.5 - 5.0 g/dL    Globulin 4.7 (H) 2.0 - 4.0 g/dL    A-G Ratio 0.3 (L) 1.1 - 2.2     GLUCOSE, POC    Collection Time: 03/19/23  6:07 AM   Result Value Ref Range    Glucose (POC) 198 (H) 65 - 117 mg/dL    Performed by Adrián Tucker    HGB & HCT    Collection Time: 03/19/23  7:34 AM   Result Value Ref Range    HGB 6.8 (L) 12.1 - 17.0 g/dL    HCT 22.6 (L) 36.6 - 50.3 %   GLUCOSE, POC    Collection Time: 03/19/23  8:38 AM   Result Value Ref Range    Glucose (POC) 187 (H) 65 - 117 mg/dL    Performed by Jerzy Gates PCT        US ABD LTD   Final Result   Nephroureteral stent in place. Otherwise unremarkable limited ultrasound. CT HEAD WO CONT   Final Result   No acute intracranial hemorrhage, mass or infarct. CT ABD PELV WO CONT   Final Result      1. LEFT lower lobe pneumonia. There is a small LEFT effusion. 2. Trace amount of free fluid in the pelvis which is nonspecific.  No acute   findings are seen in the abdomen or pelvis. 3. Bilateral percutaneous nephrostomy tubes in place. Right-sided double-J   ureteral stent is in satisfactory position. XR CHEST PORT   Final Result   No acute finding or significant change. XR CHEST PORT   Final Result   No Acute Disease. Active Problems:    Sepsis ([address] (3/17/2023)        Assessment/Plan:    Sepsis d/t Left lower lobe pneumonia- on IV zosyn, vancomcyin, check MRSA nares. BC negative. 2. DM II- holding basal insulin d/t hypoglycemia, use s/s insulin coverage. Continue IV fluids w/dextrose    3. Advanced prostate cancer- on hospice at [facility]    4. Transaminitis- liver US normal, AST/ALK phos trending down. 5. Obstructive uropathy- d/t adv prostate cancer- bilateral nephrostomy tubes. Monitor output. 6. Anemia- of chronic disease- HGB 6.8, transfuse one unit of PRBcs today. 7.  Discharge- back to LTC when clinically stable.      Medical Decision Making:    Labs reviewed by myself  CBC, BMP    Diagnostic data reviewed by myself   CT , US    Toxic drug monitoring    NA    Discussed case with   Patient, staff nurse    [facility] Discussion       Time spent 35 minutes        DVT Prophylaxis: lovenox  Code Status:  Full Code  POAGeralene Stephanie [facility] discussed with:   _______________________________________________________________    Lorrie Pfeiffer NP

## 2023-03-20 LAB
ABO + RH BLD: NORMAL
ALBUMIN SERPL-MCNC: 1.5 G/DL (ref 3.5–5)
ALBUMIN/GLOB SERPL: 0.3 (ref 1.1–2.2)
ALP SERPL-CCNC: 271 U/L (ref 45–117)
ALT SERPL-CCNC: 112 U/L (ref 12–78)
ANION GAP SERPL CALC-SCNC: 1 MMOL/L (ref 5–15)
AST SERPL-CCNC: 78 U/L (ref 15–37)
BASOPHILS # BLD: 0.1 K/UL (ref 0–0.1)
BASOPHILS NFR BLD: 1 % (ref 0–1)
BILIRUB SERPL-MCNC: 0.3 MG/DL (ref 0.2–1)
BLD PROD TYP BPU: NORMAL
BLOOD GROUP ANTIBODIES SERPL: NORMAL
BPU ID: NORMAL
BUN SERPL-MCNC: 16 MG/DL (ref 6–20)
BUN/CREAT SERPL: 16 (ref 12–20)
CALCIUM SERPL-MCNC: 7.9 MG/DL (ref 8.5–10.1)
CHLORIDE SERPL-SCNC: 106 MMOL/L (ref 97–108)
CO2 SERPL-SCNC: 29 MMOL/L (ref 21–32)
CREAT SERPL-MCNC: 1.02 MG/DL (ref 0.7–1.3)
CROSSMATCH RESULT,%XM: NORMAL
DIFFERENTIAL METHOD BLD: ABNORMAL
EOSINOPHIL # BLD: 0.1 K/UL (ref 0–0.4)
EOSINOPHIL NFR BLD: 1 % (ref 0–7)
ERYTHROCYTE [DISTWIDTH] IN BLOOD BY AUTOMATED COUNT: 20 % (ref 11.5–14.5)
GLOBULIN SER CALC-MCNC: 4.8 G/DL (ref 2–4)
GLUCOSE BLD STRIP.AUTO-MCNC: 136 MG/DL (ref 65–117)
GLUCOSE BLD STRIP.AUTO-MCNC: 137 MG/DL (ref 65–117)
GLUCOSE BLD STRIP.AUTO-MCNC: 267 MG/DL (ref 65–117)
GLUCOSE BLD STRIP.AUTO-MCNC: 271 MG/DL (ref 65–117)
GLUCOSE SERPL-MCNC: 192 MG/DL (ref 65–100)
HCT VFR BLD AUTO: 25.8 % (ref 36.6–50.3)
HGB BLD-MCNC: 8.1 G/DL (ref 12.1–17)
IMM GRANULOCYTES # BLD AUTO: 0.1 K/UL (ref 0–0.04)
IMM GRANULOCYTES NFR BLD AUTO: 1 % (ref 0–0.5)
LYMPHOCYTES # BLD: 3.4 K/UL (ref 0.8–3.5)
LYMPHOCYTES NFR BLD: 26 % (ref 12–49)
MCH RBC QN AUTO: 26.6 PG (ref 26–34)
MCHC RBC AUTO-ENTMCNC: 31.4 G/DL (ref 30–36.5)
MCV RBC AUTO: 84.6 FL (ref 80–99)
MONOCYTES # BLD: 0.7 K/UL (ref 0–1)
MONOCYTES NFR BLD: 5 % (ref 5–13)
NEUTS SEG # BLD: 8.7 K/UL (ref 1.8–8)
NEUTS SEG NFR BLD: 66 % (ref 32–75)
NRBC # BLD: 0 K/UL (ref 0–0.01)
NRBC BLD-RTO: 0 PER 100 WBC
PLATELET # BLD AUTO: 316 K/UL (ref 150–400)
PMV BLD AUTO: 9.8 FL (ref 8.9–12.9)
POTASSIUM SERPL-SCNC: 4.7 MMOL/L (ref 3.5–5.1)
PROT SERPL-MCNC: 6.3 G/DL (ref 6.4–8.2)
RBC # BLD AUTO: 3.05 M/UL (ref 4.1–5.7)
SERVICE CMNT-IMP: ABNORMAL
SODIUM SERPL-SCNC: 136 MMOL/L (ref 136–145)
SPECIMEN EXP DATE BLD: NORMAL
STATUS OF UNIT,%ST: NORMAL
UNIT DIVISION, %UDIV: 0
WBC # BLD AUTO: 12.9 K/UL (ref 4.1–11.1)

## 2023-03-20 PROCEDURE — 74011250636 HC RX REV CODE- 250/636: Performed by: PHYSICIAN ASSISTANT

## 2023-03-20 PROCEDURE — 65270000046 HC RM TELEMETRY

## 2023-03-20 PROCEDURE — 74011250637 HC RX REV CODE- 250/637: Performed by: PHYSICIAN ASSISTANT

## 2023-03-20 PROCEDURE — 74011636637 HC RX REV CODE- 636/637: Performed by: PHYSICIAN ASSISTANT

## 2023-03-20 PROCEDURE — 36415 COLL VENOUS BLD VENIPUNCTURE: CPT

## 2023-03-20 PROCEDURE — 74011000258 HC RX REV CODE- 258

## 2023-03-20 PROCEDURE — 80053 COMPREHEN METABOLIC PANEL: CPT

## 2023-03-20 PROCEDURE — 74011250636 HC RX REV CODE- 250/636

## 2023-03-20 PROCEDURE — 82962 GLUCOSE BLOOD TEST: CPT

## 2023-03-20 PROCEDURE — 85025 COMPLETE CBC W/AUTO DIFF WBC: CPT

## 2023-03-20 PROCEDURE — 74011250636 HC RX REV CODE- 250/636: Performed by: NURSE PRACTITIONER

## 2023-03-20 PROCEDURE — 74011000258 HC RX REV CODE- 258: Performed by: PHYSICIAN ASSISTANT

## 2023-03-20 PROCEDURE — 74011250637 HC RX REV CODE- 250/637

## 2023-03-20 RX ADMIN — PIPERACILLIN AND TAZOBACTAM 3.38 G: 3; .375 INJECTION, POWDER, FOR SOLUTION INTRAVENOUS at 12:29

## 2023-03-20 RX ADMIN — ENOXAPARIN SODIUM 40 MG: 100 INJECTION SUBCUTANEOUS at 12:24

## 2023-03-20 RX ADMIN — VANCOMYCIN HYDROCHLORIDE 500 MG: 500 INJECTION, POWDER, LYOPHILIZED, FOR SOLUTION INTRAVENOUS at 22:49

## 2023-03-20 RX ADMIN — MIDODRINE HYDROCHLORIDE 2.5 MG: 2.5 TABLET ORAL at 09:33

## 2023-03-20 RX ADMIN — Medication 5 UNITS: at 12:24

## 2023-03-20 RX ADMIN — PIPERACILLIN AND TAZOBACTAM 3.38 G: 3; .375 INJECTION, POWDER, FOR SOLUTION INTRAVENOUS at 18:46

## 2023-03-20 RX ADMIN — PIPERACILLIN AND TAZOBACTAM 3.38 G: 3; .375 INJECTION, POWDER, FOR SOLUTION INTRAVENOUS at 03:39

## 2023-03-20 RX ADMIN — MIDODRINE HYDROCHLORIDE 2.5 MG: 2.5 TABLET ORAL at 12:28

## 2023-03-20 RX ADMIN — Medication 1 CAPSULE: at 18:49

## 2023-03-20 RX ADMIN — VANCOMYCIN HYDROCHLORIDE 750 MG: 750 INJECTION, POWDER, LYOPHILIZED, FOR SOLUTION INTRAVENOUS at 09:34

## 2023-03-20 RX ADMIN — MIDODRINE HYDROCHLORIDE 2.5 MG: 2.5 TABLET ORAL at 18:49

## 2023-03-20 RX ADMIN — Medication 3 UNITS: at 22:50

## 2023-03-20 NOTE — PROGRESS NOTES
Pharmacy Antimicrobial Kinetic Dosing    Indication for Antimicrobials: Sepsis, UTI, Hx prostate cancer/Nephro tube     Current Regimen of Each Antimicrobial:  Vancomycin 1 gm IV q12h (Start Date 3/17; Day 4 of 7)  Zosyn 3.375 gm IV q8h (Start date 3/18; Day 3)    Previous Antimicrobial Therapy:  Cefepime 2 gm IV q8h (Start Date 3/17; Day 2)    Goal Level: Vancomycin AUC: 400-600 mg/hr/Liter/day    Date Dose & Interval Measured (mcg/mL) Predicted AUC/MELISSA   3/19 1 gm IV q12h 19.5 676                 Date & time of next level: recheck tr on 3/21    Dosing calculator used: [employer] calculator    Significant Cultures:   3/17   Blood. NG. Pending   3/14   Urine: NG. Final    Nares MRSA? Conditions for Dosing Consideration: None    Labs:  Recent Labs     23  0338 23  0524   CREA 1.02 0.77   BUN 16 13     Recent Labs     23  0338 23  0524   WBC 12.9* 10.5     Temp (24hrs), Av.3 °F (36.8 °C), Min:97.7 °F (36.5 °C), Max:98.8 °F (37.1 °C)    Creatinine Clearance (mL/min):   CrCl (Adjusted Body Weight): 73.8 mL/min   If actual weight < IBW: CrCl (Actual Body Weight) 63.7    Impression/Plan:   SCr increasing, WBCs increasing, Afebrile last 24 hours. Decrease Vancomycin to 500 mg IV q 12h for estimated AUC of 446  Ordered vancomycin level with AM labs 3/21/23  ABX coverage extended to Zosyn 3.375 gm IV q8h (DC Cefepime)  Daily BMP   Antimicrobial stop date: Vancomycin 7 days, Zosyn TBD     Pharmacy will follow daily and adjust medications as appropriate for renal function and/or serum levels.     Thank you,  JOSE CRUZ [employer]

## 2023-03-20 NOTE — PROGRESS NOTES
Hospitalist Progress Note    Subjective:   Daily Progress Note: 3/20/2023     Hospital Course:  Mr. Whitney Edwards is a 71-year-old male with PMH significant for advanced prostate cancer, diabetes, bilateral nephrostomy tubes, who resides at [facility] with [facility] hospice care, who was sent to ER and found to be septic, with WBCs 18, UA positive bacteria, and left lower lobe pneumonia on CT scan. Additionally on admission he was hypoglycemic. Receiving Vanco and Zosyn IV, blood cultures no growth to date, UA sent for reflex culture pending. Subjective:  Pt examined lying in bed watching TV, denies any pain or complaints at this time. States \"I feel alright. \"    Current Facility-Administered Medications   Medication Dose Route Frequency    vancomycin (VANCOCIN) 500 mg in 0.9% sodium chloride (MBP/ADV) 100 mL MBP  500 mg IntraVENous Q12H    [START ON 3/21/2023] Vancomycin - Draw level with AM labs 3/21/23.  Thanks  1 Each Other ONCE    L.acidophilus-paracasei-S.thermophil-bifidobacter (RISAQUAD) 8 billion cell capsule  1 Capsule Oral DAILY    0.9% sodium chloride infusion 250 mL  250 mL IntraVENous PRN    piperacillin-tazobactam (ZOSYN) 3.375 g in 0.9% sodium chloride (MBP/ADV) 100 mL MBP  3.375 g IntraVENous Q8H    dextrose 5 % - 0.45% NaCl infusion  50 mL/hr IntraVENous CONTINUOUS    insulin lispro (HUMALOG) injection   SubCUTAneous AC&HS    enoxaparin (LOVENOX) injection 40 mg  40 mg SubCUTAneous Q24H    LORazepam (ATIVAN) tablet 0.5 mg  0.5 mg Oral Q4H PRN    midodrine (PROAMATINE) tablet 2.5 mg  2.5 mg Oral TID WITH MEALS    oxyCODONE (ROXICODONE) 5 mg/5 mL oral solution 5 mg  5 mg Oral Q4H PRN    glucagon (GLUCAGEN) injection 1 mg  1 mg IntraMUSCular PRN    glucose chewable tablet 16 g  16 g Oral PRN    ondansetron (ZOFRAN) injection 4 mg  4 mg IntraVENous Q6H PRN    melatonin tablet 3 mg  3 mg Oral QHS PRN    diphenhydrAMINE (BENADRYL) injection 25 mg  25 mg IntraVENous Q6H PRN    polyethylene glycol (MIRALAX) packet 17 g  17 g Oral DAILY PRN    sodium chloride (NS) flush 5-10 mL  5-10 mL IntraVENous PRN    dextrose 10% infusion 0-250 mL  0-250 mL IntraVENous PRN        Review of Systems:    Review of Systems   Constitutional:  Positive for malaise/fatigue. Negative for chills and fever. Respiratory:  Negative for cough, sputum production and shortness of breath. Cardiovascular:  Negative for chest pain, palpitations and leg swelling. Gastrointestinal:  Negative for constipation, diarrhea, nausea and vomiting. Neurological:  Positive for weakness. Negative for dizziness. Objective:     Visit Vitals  /77 (BP 1 Location: Left arm, BP Patient Position: At rest)   Pulse 90   Temp 98.2 °F (36.8 °C)   Resp 18   Ht 6' 1\" (1.854 m)   Wt 63.2 kg (139 lb 5.3 oz)   SpO2 100%   BMI 18.38 kg/m²      O2 Device: None (Room air)    Temp (24hrs), Av.3 °F (36.8 °C), Min:97.7 °F (36.5 °C), Max:98.6 °F (37 °C)      701 - 1900  In: -   Out: 1800 [[facility]:[id number]]  1901 -  0700  In: 750   Out: 3994 [[facility]:[id number]]    PHYSICAL EXAM:    Physical Exam  Constitutional:       General: He is not in acute distress. Appearance: Normal appearance. He is cachectic. HENT:      Head: Normocephalic and atraumatic. Mouth/Throat:      Mouth: Mucous membranes are moist.   Eyes:      Pupils: Pupils are equal, round, and reactive to light. Cardiovascular:      Rate and Rhythm: Normal rate and regular rhythm. Pulses: Normal pulses. Heart sounds: Normal heart sounds. Pulmonary:      Effort: Pulmonary effort is normal. No respiratory distress. Breath sounds: Normal breath sounds. Abdominal:      General: Bowel sounds are normal. There is no distension. Palpations: Abdomen is soft. Genitourinary:     Comments: Bilateral nephrostomy tubes draining zachariah urine  Musculoskeletal:         General: Deformity present.       Comments: Left leg no movement, d/t damage to hip joint, secondary to advanced prostate cancer. Skin:     General: Skin is warm and dry. Capillary Refill: Capillary refill takes less than 2 seconds. Neurological:      Mental Status: He is alert and oriented to person, place, and time. Data Review    Recent Results (from the past 24 hour(s))   GLUCOSE, POC    Collection Time: 03/19/23  9:57 PM   Result Value Ref Range    Glucose (POC) 181 (H) 65 - 117 mg/dL    Performed by Candi Kc PCT    CBC WITH AUTOMATED DIFF    Collection Time: 03/20/23  3:38 AM   Result Value Ref Range    WBC 12.9 (H) 4.1 - 11.1 K/uL    RBC 3.05 (L) 4.10 - 5.70 M/uL    HGB 8.1 (L) 12.1 - 17.0 g/dL    HCT 25.8 (L) 36.6 - 50.3 %    MCV 84.6 80.0 - 99.0 FL    MCH 26.6 26.0 - 34.0 PG    MCHC 31.4 30.0 - 36.5 g/dL    RDW 20.0 (H) 11.5 - 14.5 %    PLATELET 633 515 - 389 K/uL    MPV 9.8 8.9 - 12.9 FL    NRBC 0.0 0  WBC    ABSOLUTE NRBC 0.00 0.00 - 0.01 K/uL    NEUTROPHILS 66 32 - 75 %    LYMPHOCYTES 26 12 - 49 %    MONOCYTES 5 5 - 13 %    EOSINOPHILS 1 0 - 7 %    BASOPHILS 1 0 - 1 %    IMMATURE GRANULOCYTES 1 (H) 0.0 - 0.5 %    ABS. NEUTROPHILS 8.7 (H) 1.8 - 8.0 K/UL    ABS. LYMPHOCYTES 3.4 0.8 - 3.5 K/UL    ABS. MONOCYTES 0.7 0.0 - 1.0 K/UL    ABS. EOSINOPHILS 0.1 0.0 - 0.4 K/UL    ABS. BASOPHILS 0.1 0.0 - 0.1 K/UL    ABS. IMM. GRANS. 0.1 (H) 0.00 - 0.04 K/UL    DF AUTOMATED     METABOLIC PANEL, COMPREHENSIVE    Collection Time: 03/20/23  3:38 AM   Result Value Ref Range    Sodium 136 136 - 145 mmol/L    Potassium 4.7 3.5 - 5.1 mmol/L    Chloride 106 97 - 108 mmol/L    CO2 29 21 - 32 mmol/L    Anion gap 1 (L) 5 - 15 mmol/L    Glucose 192 (H) 65 - 100 mg/dL    BUN 16 6 - 20 MG/DL    Creatinine 1.02 0.70 - 1.30 MG/DL    BUN/Creatinine ratio 16 12 - 20      eGFR >60 >60 ml/min/1.73m2    Calcium 7.9 (L) 8.5 - 10.1 MG/DL    Bilirubin, total 0.3 0.2 - 1.0 MG/DL    ALT (SGPT) 112 (H) 12 - 78 U/L    AST (SGOT) 78 (H) 15 - 37 U/L    Alk.  phosphatase 271 (H) 45 - 117 U/L Protein, total 6.3 (L) 6.4 - 8.2 g/dL    Albumin 1.5 (L) 3.5 - 5.0 g/dL    Globulin 4.8 (H) 2.0 - 4.0 g/dL    A-G Ratio 0.3 (L) 1.1 - 2.2     GLUCOSE, POC    Collection Time: 03/20/23  8:36 AM   Result Value Ref Range    Glucose (POC) 137 (H) 65 - 117 mg/dL    Performed by Piero Brennan2, POC    Collection Time: 03/20/23 11:39 AM   Result Value Ref Range    Glucose (POC) 271 (H) 65 - 117 mg/dL    Performed by Piero Brennan2, POC    Collection Time: 03/20/23  4:29 PM   Result Value Ref Range    Glucose (POC) 136 (H) 65 - 117 mg/dL    Performed by [employer] PCT        US ABD LTD   Final Result   Nephroureteral stent in place. Otherwise unremarkable limited ultrasound. CT HEAD WO CONT   Final Result   No acute intracranial hemorrhage, mass or infarct. CT ABD PELV WO CONT   Final Result      1. LEFT lower lobe pneumonia. There is a small LEFT effusion. 2. Trace amount of free fluid in the pelvis which is nonspecific. No acute   findings are seen in the abdomen or pelvis. 3. Bilateral percutaneous nephrostomy tubes in place. Right-sided double-J   ureteral stent is in satisfactory position. XR CHEST PORT   Final Result   No acute finding or significant change. XR CHEST PORT   Final Result   No Acute Disease.               Active Problems:    Sepsis ([address] (3/17/2023)        Assessment/Plan:   Sepsis secondary to left lower lobe pneumonia, UTI  UA +  C&S pending  Obstructive uropathy secondary to advanced prostate cancer  Bilateral nephrostomy tubes  Continue IV Vanco and Zosyn  MRSA nares pending  Blood cultures negative  De-escalating antibiotics pending final cultures  WBC improving 12.9 today  Clear zachariah output from nephrostomies  Strict I&O's    Anemia of chronic disease  Hemoglobin 6.8, received 1 unit PRBCs, hemoglobin 8.1  No S/S bleeding, melena, hematuria, hematochezia, hematemesis  Monitor closely while on Lovenox SQ for VTE prophylaxis    Advanced prostate cancer  Current hospice patient at SNF  Plan to DC back to [facility] likely 24-48h    Transaminitis- liver US normal, AST/ALK phos trending down.      Diabetes mellitus type 2  Accuchecks ACHS  Sliding scale insulin  Monitor for s/s hypo/hyperglycemia  Hypoglycemia treatment per protocol      Medical Decision Making:    Labs reviewed by myself   CBC, BMP    Diagnostic data reviewed by myself   UA, BC, CT, US    Toxic drug monitoring    None    Discussed case with   Patient, Nursing, Case Management    MDM Discussion   DC 24-48h pending WBC improvement, hgb stable, BC remain (-)    Disposition: Anticipate return to SNF 24-48h pending WBC improvement, hgb stable, BC remain (-)  DVT Prophylaxis: Lovenox  Code Status:  Full Code  Fernanda Paty - daughter [phone]     Time Spent: 35 minutes    _______________________________________________________________    PEDRO Sandoval

## 2023-03-20 NOTE — PROGRESS NOTES
Physician Progress Note      PATIENT:               Nolberto Kenny  CSN #:                  [id number]  :                       1955  ADMIT DATE:       3/17/2023 9:58 AM  DISCH DATE:  RESPONDING  PROVIDER #:        Cassidy VASQUEZ          QUERY TEXT:    Pt admitted with UTI. Pt noted to have nephrostomy tubes. If possible, please document in the progress notes and discharge summary if you are evaluating and/or treating any of the following: The medical record reflects the following:  Risk Factors: 77 y/o male to ED for mental status change. noted to have hypoglycemia, positive UA, bilateral nephrostomy tubes. hx: prostate CA,    Clinical Indicators:  H&P: Sepsis secondary to UTI; Nephrostomy tube    Treatment: admit telemetry, IV abx: cefepime, zosyn, Vanc, UA, Blood and urine cultures, abdominal CT, I/Os, vitals,  Options provided:  -- UTI due to nephrostomy tube  -- UTI not due to nephrostomy tube  -- Other - I will add my own diagnosis  -- Disagree - Not applicable / Not valid  -- Disagree - Clinically unable to determine / Unknown  -- Refer to Clinical Documentation Reviewer    PROVIDER RESPONSE TEXT:    UTI is due to nephrostomy tube.     Query created by: Luis Cassidy on 3/20/2023 2:05 PM      Electronically signed by:  Casisdy VASQUEZ 3/20/2023 5:53 PM

## 2023-03-20 NOTE — PROGRESS NOTES
CM: Deyanira Guerra is currently working with pt in the [facility]. CM staffed case with clinical team, to determine pts target d/c date. Pt could potentially ready for d/c within 24hs. Pt is known to be LTC resident at [facility], along with hospice care, provided by [facility]. Referral sent to [address] [place] [address], via cclink, and facility is prepared to accept pt at the time of d/c.  [facility] for hospice care: [facility] will be sent once deemed medical stable to d/c. Physician will address code status with pts family. CM spoke with pts daughter: Papi Quinones ([phone]), via telephone to review the following. Gi Sergey is agreeable to following, and CM will follow up once pt is medically stable to d/c. CM will continue to follow and will enter referrals as deemed necessary.     Deyanira Guerra, MSW, [address] [place] [address]

## 2023-03-20 NOTE — PROGRESS NOTES
End of Shift Note    Bedside shift change report given to Renae Hernandez RN (oncoming nurse) by Aicha Hargrove RN (offgoing nurse). Report included the following information SBAR, Kardex, Intake/Output, and MAR    Shift worked:  [id number]     Shift summary and any significant changes:     Patient tolerated care fairly well, complained of pain in left leg, groin, 1 dose of PRN pain med provided. Labs drawn. All scheduled meds, incontinent care, and frequent rounding provided. Pt had 2 small bowel movements. Both nephrostomy bags are patent and draining, output documented in the chart. 1 unit of PRBCs hung at 1845.    Concerns for physician to address:       Zone phone for oncoming shift:              Aicha Hargrove RN

## 2023-03-20 NOTE — PROGRESS NOTES
Physical Therapy Screening:  Services are not indicated at this time. [facility] based on results obtained during the nursing admission assessment. The patients chart was reviewed and the patient is not appropriate for a skilled therapy evaluation at this time. Please consult physical therapy if any therapy needs arise. Thank you.     Bernardo Jimenez, PT, DPT

## 2023-03-20 NOTE — PROGRESS NOTES
End of Shift Note    Bedside shift change report given to Cory Matthews RN (oncoming nurse) by Andrew Mi RN (offgoing nurse). Report included the following information SBAR, Kardex, Intake/Output, and MAR    Shift worked:  7P-7A     Shift summary and any significant changes:     Transfusion of RBC's was completed, patient tolerated transfusion well. Scheduled medications were given, see MAR. Patient is on continuous infusion. Nephrostomy bags are emptied. Morning labs were drawn. Frequent rounding has been done.      Concerns for physician to address:       Zone phone for oncoming shift:              Andrew Mi RN

## 2023-03-21 LAB
ALBUMIN SERPL-MCNC: 1.4 G/DL (ref 3.5–5)
ALBUMIN/GLOB SERPL: 0.3 (ref 1.1–2.2)
ALP SERPL-CCNC: 236 U/L (ref 45–117)
ALT SERPL-CCNC: 105 U/L (ref 12–78)
ANION GAP SERPL CALC-SCNC: 2 MMOL/L (ref 5–15)
AST SERPL-CCNC: 73 U/L (ref 15–37)
BACTERIA SPEC CULT: NORMAL
BACTERIA SPEC CULT: NORMAL
BASOPHILS # BLD: 0.1 K/UL (ref 0–0.1)
BASOPHILS NFR BLD: 0 % (ref 0–1)
BILIRUB SERPL-MCNC: 0.4 MG/DL (ref 0.2–1)
BUN SERPL-MCNC: 12 MG/DL (ref 6–20)
BUN/CREAT SERPL: 13 (ref 12–20)
CALCIUM SERPL-MCNC: 8.3 MG/DL (ref 8.5–10.1)
CHLORIDE SERPL-SCNC: 105 MMOL/L (ref 97–108)
CO2 SERPL-SCNC: 29 MMOL/L (ref 21–32)
CREAT SERPL-MCNC: 0.89 MG/DL (ref 0.7–1.3)
DIFFERENTIAL METHOD BLD: ABNORMAL
EOSINOPHIL # BLD: 0.1 K/UL (ref 0–0.4)
EOSINOPHIL NFR BLD: 1 % (ref 0–7)
ERYTHROCYTE [DISTWIDTH] IN BLOOD BY AUTOMATED COUNT: 20.2 % (ref 11.5–14.5)
GLOBULIN SER CALC-MCNC: 4.9 G/DL (ref 2–4)
GLUCOSE BLD STRIP.AUTO-MCNC: 145 MG/DL (ref 65–117)
GLUCOSE BLD STRIP.AUTO-MCNC: 176 MG/DL (ref 65–117)
GLUCOSE BLD STRIP.AUTO-MCNC: 246 MG/DL (ref 65–117)
GLUCOSE BLD STRIP.AUTO-MCNC: 259 MG/DL (ref 65–117)
GLUCOSE SERPL-MCNC: 179 MG/DL (ref 65–100)
HCT VFR BLD AUTO: 24.9 % (ref 36.6–50.3)
HGB BLD-MCNC: 7.9 G/DL (ref 12.1–17)
IMM GRANULOCYTES # BLD AUTO: 0.1 K/UL (ref 0–0.04)
IMM GRANULOCYTES NFR BLD AUTO: 1 % (ref 0–0.5)
LYMPHOCYTES # BLD: 3.1 K/UL (ref 0.8–3.5)
LYMPHOCYTES NFR BLD: 25 % (ref 12–49)
MCH RBC QN AUTO: 27.1 PG (ref 26–34)
MCHC RBC AUTO-ENTMCNC: 31.7 G/DL (ref 30–36.5)
MCV RBC AUTO: 85.3 FL (ref 80–99)
MONOCYTES # BLD: 0.7 K/UL (ref 0–1)
MONOCYTES NFR BLD: 5 % (ref 5–13)
NEUTS SEG # BLD: 8.6 K/UL (ref 1.8–8)
NEUTS SEG NFR BLD: 68 % (ref 32–75)
NRBC # BLD: 0 K/UL (ref 0–0.01)
NRBC BLD-RTO: 0 PER 100 WBC
PLATELET # BLD AUTO: 315 K/UL (ref 150–400)
PMV BLD AUTO: 10 FL (ref 8.9–12.9)
POTASSIUM SERPL-SCNC: 4.5 MMOL/L (ref 3.5–5.1)
PROT SERPL-MCNC: 6.3 G/DL (ref 6.4–8.2)
RBC # BLD AUTO: 2.92 M/UL (ref 4.1–5.7)
SERVICE CMNT-IMP: ABNORMAL
SERVICE CMNT-IMP: NORMAL
SODIUM SERPL-SCNC: 136 MMOL/L (ref 136–145)
VANCOMYCIN SERPL-MCNC: 19.5 UG/ML
WBC # BLD AUTO: 12.7 K/UL (ref 4.1–11.1)

## 2023-03-21 PROCEDURE — 82962 GLUCOSE BLOOD TEST: CPT

## 2023-03-21 PROCEDURE — 74011000250 HC RX REV CODE- 250: Performed by: PHYSICIAN ASSISTANT

## 2023-03-21 PROCEDURE — 74011250637 HC RX REV CODE- 250/637: Performed by: PHYSICIAN ASSISTANT

## 2023-03-21 PROCEDURE — 74011000258 HC RX REV CODE- 258: Performed by: PHYSICIAN ASSISTANT

## 2023-03-21 PROCEDURE — 80202 ASSAY OF VANCOMYCIN: CPT

## 2023-03-21 PROCEDURE — 85025 COMPLETE CBC W/AUTO DIFF WBC: CPT

## 2023-03-21 PROCEDURE — 74011250636 HC RX REV CODE- 250/636

## 2023-03-21 PROCEDURE — 80053 COMPREHEN METABOLIC PANEL: CPT

## 2023-03-21 PROCEDURE — 65270000046 HC RM TELEMETRY

## 2023-03-21 PROCEDURE — 74011250636 HC RX REV CODE- 250/636: Performed by: PHYSICIAN ASSISTANT

## 2023-03-21 PROCEDURE — 74011636637 HC RX REV CODE- 636/637: Performed by: PHYSICIAN ASSISTANT

## 2023-03-21 PROCEDURE — 36415 COLL VENOUS BLD VENIPUNCTURE: CPT

## 2023-03-21 PROCEDURE — 74011250637 HC RX REV CODE- 250/637

## 2023-03-21 RX ORDER — VANCOMYCIN HYDROCHLORIDE
1250 EVERY 24 HOURS
Status: DISCONTINUED | OUTPATIENT
Start: 2023-03-21 | End: 2023-03-22

## 2023-03-21 RX ADMIN — PIPERACILLIN AND TAZOBACTAM 3.38 G: 3; .375 INJECTION, POWDER, FOR SOLUTION INTRAVENOUS at 03:55

## 2023-03-21 RX ADMIN — OXYCODONE HYDROCHLORIDE 5 MG: 5 SOLUTION ORAL at 17:26

## 2023-03-21 RX ADMIN — MIDODRINE HYDROCHLORIDE 2.5 MG: 2.5 TABLET ORAL at 12:11

## 2023-03-21 RX ADMIN — ENOXAPARIN SODIUM 40 MG: 100 INJECTION SUBCUTANEOUS at 12:11

## 2023-03-21 RX ADMIN — Medication 3 UNITS: at 12:11

## 2023-03-21 RX ADMIN — MIDODRINE HYDROCHLORIDE 2.5 MG: 2.5 TABLET ORAL at 09:08

## 2023-03-21 RX ADMIN — OXYCODONE HYDROCHLORIDE 5 MG: 5 SOLUTION ORAL at 22:03

## 2023-03-21 RX ADMIN — Medication 2 UNITS: at 09:08

## 2023-03-21 RX ADMIN — DEXTROSE AND SODIUM CHLORIDE 50 ML/HR: 5; 450 INJECTION, SOLUTION INTRAVENOUS at 02:48

## 2023-03-21 RX ADMIN — PIPERACILLIN AND TAZOBACTAM 3.38 G: 3; .375 INJECTION, POWDER, FOR SOLUTION INTRAVENOUS at 20:43

## 2023-03-21 RX ADMIN — Medication 1 CAPSULE: at 09:08

## 2023-03-21 RX ADMIN — PIPERACILLIN AND TAZOBACTAM 3.38 G: 3; .375 INJECTION, POWDER, FOR SOLUTION INTRAVENOUS at 12:18

## 2023-03-21 RX ADMIN — MIDODRINE HYDROCHLORIDE 2.5 MG: 2.5 TABLET ORAL at 17:19

## 2023-03-21 RX ADMIN — Medication 2 UNITS: at 17:20

## 2023-03-21 RX ADMIN — Medication 3 UNITS: at 22:11

## 2023-03-21 RX ADMIN — VANCOMYCIN HYDROCHLORIDE 1250 MG: 10 INJECTION, POWDER, LYOPHILIZED, FOR SOLUTION INTRAVENOUS at 11:32

## 2023-03-21 NOTE — PROGRESS NOTES
Pharmacy Antimicrobial Kinetic Dosing    Indication for Antimicrobials: Sepsis, UTI, Hx prostate cancer/Nephro tube     Current Regimen of Each Antimicrobial:  Vancomycin pharmacy to dose (Start Date 3/17; Day 5 of 7)  Zosyn 3.375 gm IV q8h (Start Date 3/18; Day 4)    Previous Antimicrobial Therapy:  Cefepime 2 gm IV q8h (3/17-3/18)    Goal Level: Vancomycin AUC: 400-600 mg/hr/Liter/day    Date Dose & Interval Measured (mcg/mL) Predicted AUC/MELISSA   3/19 1,750 mg x 1 load  1,000 mg Q12 x 3 19.5 676   3/21 750 mg Q12 x 2  500 mg Q12 x 1 19.5 406/13.7           Date & time of next level: 24-48 hours    Dosing calculator used: [employer] calculator    Significant Cultures:   3/19 Nares MRSA: pending  3/18 Blood x2: NGx3D pending  3/17 Blood: NGx4D pending  3/17 Urine: NG final    Conditions for Dosing Consideration: None    Labs:  Recent Labs     23  0352 23  0338 23  0524   CREA 0.89 1.02 0.77   BUN 12 16 13     Recent Labs     23  0352 23  0338 23  0524   WBC 12.7* 12.9* 10.5     Temp (24hrs), Av.5 °F (36.9 °C), Min:98.4 °F (36.9 °C), Max:98.6 °F (37 °C)    Creatinine Clearance (mL/min):   CrCl (Adjusted Body Weight): 84.6 mL/min   If actual weight < IBW: CrCl (Actual Body Weight) 73.0    Impression/Plan:   SCr decreasing, WBCs stable but above normal limits, Afebrile last 24 hours. Vancomycin level from 3/21 with AM labs, drawn appropriately, resulted as 19.5 which projects on the lower end of therapeutic. Renal function improving since yesterday so anticipated to need a higher dose. Increase Vancomycin to 1,250 mg IV Q24H for estimated AUC of 505 and trough of 14.3  No vancomycin level needed unless planning to continue antibiotic therapy past initial 7 day course.   Continue Zosyn 3.375 gm IV q8h  Daily BMP ordered  Antimicrobial stop date: Vancomycin 7 days, 3/23/23; Zosyn TBD     Pharmacy will follow daily and adjust medications as appropriate for renal function and/or serum levels.     Thank you,  JOSE CRUZ Velez

## 2023-03-21 NOTE — PROGRESS NOTES
Bedside, Verbal, and Written shift change report given to 8 [facility] (oncoming nurse) by David Marte (offgoing nurse). Report included the following information SBAR, Kardex, MAR, Recent Results, and Med Rec Status.     Had 2 motions of soft stool

## 2023-03-21 NOTE — PROGRESS NOTES
CM informed that pt will return back to facility: [address] [place] [address] at the time of d/c and will resume hospice services, with [facility]. Pt will d/c on 3/22/23 after IV abx is completed. CM arranged transport with [facility] on 3/22/23 at 12P.     GURWINDER Bedolla, [address] [place] [address]

## 2023-03-21 NOTE — PROGRESS NOTES
Problem: Falls - Risk of  Goal: *Absence of Falls  Description: Document Rnoi Covarruibas Fall Risk and appropriate interventions in the flowsheet.   Outcome: Progressing Towards Goal  Note: Fall Risk Interventions:                 Elimination Interventions: Call light in reach, Patient to call for help with toileting needs

## 2023-03-21 NOTE — PROGRESS NOTES
Hospitalist Progress Note    Subjective:   Daily Progress Note: 3/21/2023     Hospital Course:  Mr. Major Melo is a 61-year-old male with PMH significant for advanced prostate cancer, diabetes, bilateral nephrostomy tubes, who resides at [facility] facility with [facility] hospice care, who was sent to ER and found to be septic, with WBCs 18, UA positive bacteria, and left lower lobe pneumonia on CT scan. Additionally on admission he was hypoglycemic. Receiving Vanco and Zosyn IV, blood cultures no growth to date x3 and 4 days, MRSA chante (-), urine no growth final result. Per d/w pharmacy we will conclude vanco and zosyn after 3/21 as this will complete a 5 day course, WBC improving 12, and can DC back to facility in AM with a short course of broad spectrum PO. Subjective:  Pt examined lying in bed watching TV, denies any pain or complaints at this time. States \"can I go back home maybe this afternoon? \"    Current Facility-Administered Medications   Medication Dose Route Frequency    vancomycin (VANCOCIN) 1250 mg in  ml infusion  1,250 mg IntraVENous Q24H    L.acidophilus-paracasei-S.thermophil-bifidobacter (RISAQUAD) 8 billion cell capsule  1 Capsule Oral DAILY    0.9% sodium chloride infusion 250 mL  250 mL IntraVENous PRN    piperacillin-tazobactam (ZOSYN) 3.375 g in 0.9% sodium chloride (MBP/ADV) 100 mL MBP  3.375 g IntraVENous Q8H    dextrose 5 % - 0.45% NaCl infusion  50 mL/hr IntraVENous CONTINUOUS    insulin lispro (HUMALOG) injection   SubCUTAneous AC&HS    enoxaparin (LOVENOX) injection 40 mg  40 mg SubCUTAneous Q24H    LORazepam (ATIVAN) tablet 0.5 mg  0.5 mg Oral Q4H PRN    midodrine (PROAMATINE) tablet 2.5 mg  2.5 mg Oral TID WITH MEALS    oxyCODONE (ROXICODONE) 5 mg/5 mL oral solution 5 mg  5 mg Oral Q4H PRN    glucagon (GLUCAGEN) injection 1 mg  1 mg IntraMUSCular PRN    glucose chewable tablet 16 g  16 g Oral PRN    ondansetron (ZOFRAN) injection 4 mg  4 mg IntraVENous Q6H PRN melatonin tablet 3 mg  3 mg Oral QHS PRN    diphenhydrAMINE (BENADRYL) injection 25 mg  25 mg IntraVENous Q6H PRN    polyethylene glycol (MIRALAX) packet 17 g  17 g Oral DAILY PRN    sodium chloride (NS) flush 5-10 mL  5-10 mL IntraVENous PRN    dextrose 10% infusion 0-250 mL  0-250 mL IntraVENous PRN        Review of Systems:    Review of Systems   Constitutional:  Negative for chills, fever and malaise/fatigue. Respiratory:  Negative for cough, sputum production and shortness of breath. Cardiovascular:  Negative for chest pain, palpitations and leg swelling. Gastrointestinal:  Negative for constipation, diarrhea, nausea and vomiting. Neurological:  Positive for weakness. Negative for dizziness. Objective:     Visit Vitals  /79   Pulse 83   Temp 98.1 °F (36.7 °C)   Resp 16   Ht 6' 1\" (1.854 m)   Wt 63.2 kg (139 lb 5.3 oz)   SpO2 100%   BMI 18.38 kg/m²      O2 Device: None (Room air)    Temp (24hrs), Av.4 °F (36.9 °C), Min:98.1 °F (36.7 °C), Max:98.6 °F (37 °C)      No intake/output data recorded.  0701 -  1900  In: -   Out: 2955 [Urine:2955]    PHYSICAL EXAM:    Physical Exam  Constitutional:       General: He is not in acute distress. Appearance: Normal appearance. He is cachectic. HENT:      Head: Normocephalic and atraumatic. Mouth/Throat:      Mouth: Mucous membranes are moist.   Eyes:      Pupils: Pupils are equal, round, and reactive to light. Cardiovascular:      Rate and Rhythm: Normal rate and regular rhythm. Pulses: Normal pulses. Heart sounds: Normal heart sounds. Pulmonary:      Effort: Pulmonary effort is normal. No respiratory distress. Breath sounds: Normal breath sounds. Abdominal:      General: Bowel sounds are normal. There is no distension. Palpations: Abdomen is soft. Genitourinary:     Comments: Bilateral nephrostomy tubes draining zachariah urine  Musculoskeletal:         General: Deformity present.       Comments: Left leg no movement, d/t damage to hip joint, secondary to advanced prostate cancer. Skin:     General: Skin is warm and dry. Capillary Refill: Capillary refill takes less than 2 seconds. Neurological:      Mental Status: He is alert and oriented to person, place, and time. Data Review    Recent Results (from the past 24 hour(s))   GLUCOSE, POC    Collection Time: 03/20/23  8:48 PM   Result Value Ref Range    Glucose (POC) 267 (H) 65 - 117 mg/dL    Performed by Emmanuelle Torres PCT    CBC WITH AUTOMATED DIFF    Collection Time: 03/21/23  3:52 AM   Result Value Ref Range    WBC 12.7 (H) 4.1 - 11.1 K/uL    RBC 2.92 (L) 4.10 - 5.70 M/uL    HGB 7.9 (L) 12.1 - 17.0 g/dL    HCT 24.9 (L) 36.6 - 50.3 %    MCV 85.3 80.0 - 99.0 FL    MCH 27.1 26.0 - 34.0 PG    MCHC 31.7 30.0 - 36.5 g/dL    RDW 20.2 (H) 11.5 - 14.5 %    PLATELET 992 513 - 237 K/uL    MPV 10.0 8.9 - 12.9 FL    NRBC 0.0 0  WBC    ABSOLUTE NRBC 0.00 0.00 - 0.01 K/uL    NEUTROPHILS 68 32 - 75 %    LYMPHOCYTES 25 12 - 49 %    MONOCYTES 5 5 - 13 %    EOSINOPHILS 1 0 - 7 %    BASOPHILS 0 0 - 1 %    IMMATURE GRANULOCYTES 1 (H) 0.0 - 0.5 %    ABS. NEUTROPHILS 8.6 (H) 1.8 - 8.0 K/UL    ABS. LYMPHOCYTES 3.1 0.8 - 3.5 K/UL    ABS. MONOCYTES 0.7 0.0 - 1.0 K/UL    ABS. EOSINOPHILS 0.1 0.0 - 0.4 K/UL    ABS. BASOPHILS 0.1 0.0 - 0.1 K/UL    ABS. IMM. GRANS. 0.1 (H) 0.00 - 0.04 K/UL    DF AUTOMATED     METABOLIC PANEL, COMPREHENSIVE    Collection Time: 03/21/23  3:52 AM   Result Value Ref Range    Sodium 136 136 - 145 mmol/L    Potassium 4.5 3.5 - 5.1 mmol/L    Chloride 105 97 - 108 mmol/L    CO2 29 21 - 32 mmol/L    Anion gap 2 (L) 5 - 15 mmol/L    Glucose 179 (H) 65 - 100 mg/dL    BUN 12 6 - 20 MG/DL    Creatinine 0.89 0.70 - 1.30 MG/DL    BUN/Creatinine ratio 13 12 - 20      eGFR >60 >60 ml/min/1.73m2    Calcium 8.3 (L) 8.5 - 10.1 MG/DL    Bilirubin, total 0.4 0.2 - 1.0 MG/DL    ALT (SGPT) 105 (H) 12 - 78 U/L    AST (SGOT) 73 (H) 15 - 37 U/L    Alk. phosphatase 236 (H) 45 - 117 U/L    Protein, total 6.3 (L) 6.4 - 8.2 g/dL    Albumin 1.4 (L) 3.5 - 5.0 g/dL    Globulin 4.9 (H) 2.0 - 4.0 g/dL    A-G Ratio 0.3 (L) 1.1 - 2.2     VANCOMYCIN, RANDOM    Collection Time: 03/21/23  3:52 AM   Result Value Ref Range    Vancomycin, random 19.5 UG/ML   GLUCOSE, POC    Collection Time: 03/21/23  8:26 AM   Result Value Ref Range    Glucose (POC) 145 (H) 65 - 117 mg/dL    Performed by Ken Alexandre, POC    Collection Time: 03/21/23 11:23 AM   Result Value Ref Range    Glucose (POC) 246 (H) 65 - 117 mg/dL    Performed by Ken Alexandre, POC    Collection Time: 03/21/23  4:08 PM   Result Value Ref Range    Glucose (POC) 176 (H) 65 - 117 mg/dL    Performed by Ed Pacheco PCT        US ABD LTD   Final Result   Nephroureteral stent in place. Otherwise unremarkable limited ultrasound. CT HEAD WO CONT   Final Result   No acute intracranial hemorrhage, mass or infarct. CT ABD PELV WO CONT   Final Result      1. LEFT lower lobe pneumonia. There is a small LEFT effusion. 2. Trace amount of free fluid in the pelvis which is nonspecific. No acute   findings are seen in the abdomen or pelvis. 3. Bilateral percutaneous nephrostomy tubes in place. Right-sided double-J   ureteral stent is in satisfactory position. XR CHEST PORT   Final Result   No acute finding or significant change. XR CHEST PORT   Final Result   No Acute Disease.               Active Problems:    Sepsis ([address] (3/17/2023)      Assessment/Plan:   Sepsis secondary to left lower lobe pneumonia, UTI  UA +  C&S pending  Obstructive uropathy secondary to advanced prostate cancer  Bilateral nephrostomy tubes  Continue IV Vanco and Zosyn  MRSA nares pending  Blood cultures negative  De-escalating antibiotics pending final cultures  WBC improving 12.9 today  Clear zachariah output from nephrostomies  Strict I&O's  3/21 Urine no growth final, BC no growth 3 and 4 days, will conclude IV abx with today's doses and DC tomorrow AM with broad spectrum PO covg    Anemia of chronic disease  Hemoglobin 6.8, received 1 unit PRBCs, hemoglobin 8.1  No S/S bleeding, melena, hematuria, hematochezia, hematemesis  Monitor closely while on Lovenox SQ for VTE prophylaxis  3/21 Hgb 7.9, no active s/s of bleeding    Advanced prostate cancer  Current hospice patient at [facility]  Plan to DC back to [facility] R likely 3/21    Transaminitis- liver US normal, AST/ALK phos trending down.      Diabetes mellitus type 2  Accuchecks ACHS  Sliding scale insulin  Monitor for s/s hypo/hyperglycemia  Hypoglycemia treatment per protocol      Medical Decision Making:    Labs reviewed by myself   CBC, BMP    Diagnostic data reviewed by myself   UA, BC, CT, US    Toxic drug monitoring    None    Discussed case with   Patient, Nursing, Case Management    MDM Discussion   DC in AM 3/22 when arrangements made from transport back to [facility]    DVT Prophylaxis: Lovenox  Code Status:  Full Code  POA: Richard Coombs - daughter [phone]     Time Spent: 25 minutes    _______________________________________________________________    PEDRO Ramirez

## 2023-03-21 NOTE — PROGRESS NOTES
End of Shift Note    Bedside shift change report given to Carissa Dey RN (oncoming nurse) by Deni Padilla RN (offgoing nurse). Report included the following information SBAR, Kardex, and MAR    Shift worked:  7 am to 7:30 pm     Shift summary and any significant changes: All scheduled medications administered, see MAR. Patient denied pain during shift. NP started patient on daily probiotic due to patient having several soft loose bowel movements starting today; dose administered, see MAR. Bath, incontinence care and linen/gown change completed. Nephrostomy drains assessed and outputs documented. Remote telemetry order was discontinued. IV's  flushed and patent. Patient hoping to be discharged back to [facility] tomorrow. Nursing rounds and education completed. Concerns for physician to address:       Zone phone for oncoming shift:          Activity:  Activity Level: Bed Rest  Number times ambulated in hallways past shift: 0  Number of times OOB to chair past shift: 0    Cardiac:   Cardiac Monitoring: No    (order discontinued during shift)  Cardiac Rhythm: Sinus Rhythm    Access:  Current line(s): PIV     Genitourinary:   Urinary status: voiding (bilateral nephrostomy tubes)    Respiratory:   O2 Device: None (Room air)  Chronic home O2 use?: NO  Incentive spirometer at bedside: NO       GI:  Last Bowel Movement Date: 03/20/23  Current diet:  ADULT DIET Regular  ADULT ORAL NUTRITION SUPPLEMENT Breakfast, Lunch, Dinner; Diabetic Supplement  DIET ONE TIME MESSAGE  Passing flatus: YES  Tolerating current diet: YES       Pain Management:   Patient states pain is manageable on current regimen: YES    Skin:  Armando Score: 14  Interventions: float heels, limit briefs, and nutritional support     Patient Safety:  Fall Score:  Total Score: 1  Interventions: bed/chair alarm and gripper socks       Length of Stay:  Expected LOS: 5d 0h  Actual LOS: [address], RN

## 2023-03-22 VITALS
RESPIRATION RATE: 18 BRPM | DIASTOLIC BLOOD PRESSURE: 84 MMHG | HEIGHT: 73 IN | BODY MASS INDEX: 18.47 KG/M2 | SYSTOLIC BLOOD PRESSURE: 148 MMHG | WEIGHT: 139.33 LBS | TEMPERATURE: 98.4 F | HEART RATE: 95 BPM | OXYGEN SATURATION: 100 %

## 2023-03-22 LAB
ALBUMIN SERPL-MCNC: 1.5 G/DL (ref 3.5–5)
ALBUMIN/GLOB SERPL: 0.3 (ref 1.1–2.2)
ALP SERPL-CCNC: 213 U/L (ref 45–117)
ALT SERPL-CCNC: 107 U/L (ref 12–78)
ANION GAP SERPL CALC-SCNC: 3 MMOL/L (ref 5–15)
AST SERPL-CCNC: 71 U/L (ref 15–37)
BASOPHILS # BLD: 0 K/UL (ref 0–0.1)
BASOPHILS NFR BLD: 0 % (ref 0–1)
BILIRUB SERPL-MCNC: 0.4 MG/DL (ref 0.2–1)
BUN SERPL-MCNC: 9 MG/DL (ref 6–20)
BUN/CREAT SERPL: 13 (ref 12–20)
CALCIUM SERPL-MCNC: 8.1 MG/DL (ref 8.5–10.1)
CHLORIDE SERPL-SCNC: 101 MMOL/L (ref 97–108)
CO2 SERPL-SCNC: 28 MMOL/L (ref 21–32)
CREAT SERPL-MCNC: 0.72 MG/DL (ref 0.7–1.3)
DIFFERENTIAL METHOD BLD: ABNORMAL
EOSINOPHIL # BLD: 0.1 K/UL (ref 0–0.4)
EOSINOPHIL NFR BLD: 1 % (ref 0–7)
ERYTHROCYTE [DISTWIDTH] IN BLOOD BY AUTOMATED COUNT: 20.6 % (ref 11.5–14.5)
GLOBULIN SER CALC-MCNC: 5.2 G/DL (ref 2–4)
GLUCOSE BLD STRIP.AUTO-MCNC: 148 MG/DL (ref 65–117)
GLUCOSE BLD STRIP.AUTO-MCNC: 180 MG/DL (ref 65–117)
GLUCOSE SERPL-MCNC: 142 MG/DL (ref 65–100)
HCT VFR BLD AUTO: 26.1 % (ref 36.6–50.3)
HGB BLD-MCNC: 8.2 G/DL (ref 12.1–17)
IMM GRANULOCYTES # BLD AUTO: 0.1 K/UL (ref 0–0.04)
IMM GRANULOCYTES NFR BLD AUTO: 1 % (ref 0–0.5)
LYMPHOCYTES # BLD: 3.7 K/UL (ref 0.8–3.5)
LYMPHOCYTES NFR BLD: 25 % (ref 12–49)
MCH RBC QN AUTO: 26.8 PG (ref 26–34)
MCHC RBC AUTO-ENTMCNC: 31.4 G/DL (ref 30–36.5)
MCV RBC AUTO: 85.3 FL (ref 80–99)
MONOCYTES # BLD: 0.9 K/UL (ref 0–1)
MONOCYTES NFR BLD: 6 % (ref 5–13)
NEUTS SEG # BLD: 10 K/UL (ref 1.8–8)
NEUTS SEG NFR BLD: 67 % (ref 32–75)
NRBC # BLD: 0 K/UL (ref 0–0.01)
NRBC BLD-RTO: 0 PER 100 WBC
PLATELET # BLD AUTO: 341 K/UL (ref 150–400)
PMV BLD AUTO: 9.2 FL (ref 8.9–12.9)
POTASSIUM SERPL-SCNC: 4.2 MMOL/L (ref 3.5–5.1)
PROT SERPL-MCNC: 6.7 G/DL (ref 6.4–8.2)
RBC # BLD AUTO: 3.06 M/UL (ref 4.1–5.7)
RBC MORPH BLD: ABNORMAL
SERVICE CMNT-IMP: ABNORMAL
SERVICE CMNT-IMP: ABNORMAL
SODIUM SERPL-SCNC: 132 MMOL/L (ref 136–145)
WBC # BLD AUTO: 14.8 K/UL (ref 4.1–11.1)

## 2023-03-22 PROCEDURE — 74011250637 HC RX REV CODE- 250/637

## 2023-03-22 PROCEDURE — 74011250636 HC RX REV CODE- 250/636: Performed by: PHYSICIAN ASSISTANT

## 2023-03-22 PROCEDURE — 74011000250 HC RX REV CODE- 250: Performed by: PHYSICIAN ASSISTANT

## 2023-03-22 PROCEDURE — 74011000258 HC RX REV CODE- 258: Performed by: PHYSICIAN ASSISTANT

## 2023-03-22 PROCEDURE — 85025 COMPLETE CBC W/AUTO DIFF WBC: CPT

## 2023-03-22 PROCEDURE — 74011250637 HC RX REV CODE- 250/637: Performed by: PHYSICIAN ASSISTANT

## 2023-03-22 PROCEDURE — 36415 COLL VENOUS BLD VENIPUNCTURE: CPT

## 2023-03-22 PROCEDURE — 74011636637 HC RX REV CODE- 636/637: Performed by: PHYSICIAN ASSISTANT

## 2023-03-22 PROCEDURE — 82962 GLUCOSE BLOOD TEST: CPT

## 2023-03-22 PROCEDURE — 74011250636 HC RX REV CODE- 250/636

## 2023-03-22 PROCEDURE — 80053 COMPREHEN METABOLIC PANEL: CPT

## 2023-03-22 RX ORDER — LEVOFLOXACIN 750 MG/1
750 TABLET ORAL EVERY 24 HOURS
Qty: 5 TABLET | Refills: 0 | Status: SHIPPED | OUTPATIENT
Start: 2023-03-22 | End: 2023-03-27

## 2023-03-22 RX ADMIN — PIPERACILLIN AND TAZOBACTAM 3.38 G: 3; .375 INJECTION, POWDER, FOR SOLUTION INTRAVENOUS at 11:12

## 2023-03-22 RX ADMIN — VANCOMYCIN HYDROCHLORIDE 1250 MG: 10 INJECTION, POWDER, LYOPHILIZED, FOR SOLUTION INTRAVENOUS at 09:02

## 2023-03-22 RX ADMIN — ENOXAPARIN SODIUM 40 MG: 100 INJECTION SUBCUTANEOUS at 11:13

## 2023-03-22 RX ADMIN — PIPERACILLIN AND TAZOBACTAM 3.38 G: 3; .375 INJECTION, POWDER, FOR SOLUTION INTRAVENOUS at 05:34

## 2023-03-22 RX ADMIN — DEXTROSE AND SODIUM CHLORIDE 50 ML/HR: 5; 450 INJECTION, SOLUTION INTRAVENOUS at 05:40

## 2023-03-22 RX ADMIN — OXYCODONE HYDROCHLORIDE 5 MG: 5 SOLUTION ORAL at 05:33

## 2023-03-22 RX ADMIN — Medication 1 CAPSULE: at 09:01

## 2023-03-22 RX ADMIN — Medication 2 UNITS: at 09:00

## 2023-03-22 NOTE — DISCHARGE SUMMARY
Hospitalist Discharge Summary     Patient ID:    Amna Cunningham  [id number]  89 y.o.  1955    Admit date: 3/17/2023    Discharge date : 3/22/2023        Final Diagnoses: Active Problems:    Sepsis ([address] (3/17/2023)    Advanced prostate cancer  DM2  Urinary tract infection  Sepsis    Reason for Hospitalization:  UTI, sepsis    Hospital Course:   Mr. Amna Cunningham is a 79-year-old male with PMH significant for advanced prostate cancer, diabetes, bilateral nephrostomy tubes, who resides at [facility] facility with [facility] hospice care, who was sent to ER with hypoglycemia and found to be septic, with WBCs 18, UA positive bacteria, and left lower lobe pneumonia on CT scan. He received a total 5 days of Vanco and Zosyn, BC no growth, MRSA nares (-), urine no growth on final cx,  so de-escalated to PO Levaquin x 5 days. Pt with no complaints, no cough, sputum, or SOB, on room air saturating high 90s. Hypoglycemia resolved, blood sugars stable and adequate PO intake. DC back to [facility] with resumption of [facility] care for his advanced prostate cancer. Pt verbalizes agreement with discharge  and daughter made aware of DC back to SNF as well. Discharge Medications:   Discharge Medication List as of 3/22/2023 11:48 AM        START taking these medications    Details   L.acid,para-B. bifidum-S.therm (RISAQUAD) 8 billion cell cap cap Take 1 Capsule by mouth daily for 30 days. , Print, Disp-30 Capsule, R-0      levoFLOXacin (LEVAQUIN) 750 mg tablet Take 1 Tablet by mouth every twenty-four (24) hours for 5 days. , Print, Disp-5 Tablet, R-0           CONTINUE these medications which have NOT CHANGED    Details   insulin glargine (LANTUS,BASAGLAR) 100 unit/mL (3 mL) inpn 50 Units by SubCUTAneous route nightly., Historical Med      insulin aspart U-100 (NOVOLOG) 100 unit/mL injection 8 Units by SubCUTAneous route Before breakfast, lunch, and dinner., Historical Med      hyoscyamine SL (LEVSIN/SL) 0.125 mg SL tablet 0.125 mg by SubLINGual route every four (4) hours as needed for PRN Reason (Other) (increased secretions). , Historical Med      LORazepam (ATIVAN) 0.5 mg tablet Take 0.5 mg by mouth every four (4) hours as needed for Anxiety. , Historical Med      midodrine (PROAMATINE) 2.5 mg tablet Take 2.5 mg by mouth three (3) times daily (with meals). , Historical Med      morphine (ROXANOL) 100 mg/5 mL (20 mg/mL) concentrated solution Take 10 mg by mouth every four (4) hours as needed for Pain. 0.5 ml or 10 mg/dose, Historical Med      oxyCODONE (OXYIR) 5 mg capsule Take 5 mg by mouth every four (4) hours as needed for Pain., Historical Med      acetaminophen (TYLENOL) 650 mg suppository Insert 650 mg into rectum every four (4) hours as needed for Fever., Historical Med      atorvastatin (LIPITOR) 10 mg tablet Take 10 mg by mouth daily. , Historical Med      bisacodyL (DULCOLAX) 10 mg supp Insert 10 mg into rectum daily as needed for Constipation. , Historical Med      mirtazapine (REMERON) 15 mg tablet Take 15 mg by mouth nightly. For depression, Historical Med      therapeutic multivitamin-minerals (THERAGRAN-M) tablet Take 1 Tablet by mouth daily. , Historical Med               Follow up Care:    1. None in 1-2 weeks. Follow-up Information       Follow up With Specialties Details Why [address] [place] [address] Clive Hunter   [address]  [phone]    None    None (561) Patient stated that they have no PCP                * Follow-up Care/Patient Instructions:   Activity: Activity as tolerated  Diet: Regular Diet  Wound Care: None needed      Condition at Discharge:  Stable  __________________________________________________________________    Disposition  [facility]  ____________________________________________________________________    Code Status:  Prior  ___________________________________________________________________    Discharge Exam:  Patient seen and examined by me on discharge day. Physical Exam  Constitutional:       General: He is not in acute distress. Appearance: Normal appearance. He is cachectic. HENT:      Head: Normocephalic and atraumatic. Mouth/Throat:      Mouth: Mucous membranes are moist.   Eyes:      Pupils: Pupils are equal, round, and reactive to light. Cardiovascular:      Rate and Rhythm: Normal rate and regular rhythm. Pulses: Normal pulses. Heart sounds: Normal heart sounds. Pulmonary:      Effort: Pulmonary effort is normal. No respiratory distress. Breath sounds: Normal breath sounds. Abdominal:      General: Bowel sounds are normal. There is no distension. Palpations: Abdomen is soft. Genitourinary:     Comments: Bilateral nephrostomy tubes draining zachariah urine  Musculoskeletal:         General: Deformity present. Comments: Left leg no movement, d/t damage to hip joint, secondary to advanced prostate cancer. Skin:     General: Skin is warm and dry. Capillary Refill: Capillary refill takes less than 2 seconds. Neurological:      Mental Status: He is alert and oriented to person, place, and time.      CONSULTATIONS: None    Significant Diagnostic Studies:   Recent Results (from the past 24 hour(s))   GLUCOSE, POC    Collection Time: 03/21/23 10:07 PM   Result Value Ref Range    Glucose (POC) 259 (H) 65 - 117 mg/dL    Performed by Skye Selby    CBC WITH AUTOMATED DIFF    Collection Time: 03/22/23  5:01 AM   Result Value Ref Range    WBC 14.8 (H) 4.1 - 11.1 K/uL    RBC 3.06 (L) 4.10 - 5.70 M/uL    HGB 8.2 (L) 12.1 - 17.0 g/dL    HCT 26.1 (L) 36.6 - 50.3 %    MCV 85.3 80.0 - 99.0 FL    MCH 26.8 26.0 - 34.0 PG    MCHC 31.4 30.0 - 36.5 g/dL    RDW 20.6 (H) 11.5 - 14.5 %    PLATELET 705 988 - 739 K/uL    MPV 9.2 8.9 - 12.9 FL    NRBC 0.0 0  WBC    ABSOLUTE NRBC 0.00 0.00 - 0.01 K/uL    NEUTROPHILS 67 32 - 75 %    LYMPHOCYTES 25 12 - 49 %    MONOCYTES 6 5 - 13 %    EOSINOPHILS 1 0 - 7 %    BASOPHILS 0 0 - 1 %    IMMATURE GRANULOCYTES 1 (H) 0.0 - 0.5 %    ABS. NEUTROPHILS 10.0 (H) 1.8 - 8.0 K/UL    ABS. LYMPHOCYTES 3.7 (H) 0.8 - 3.5 K/UL    ABS. MONOCYTES 0.9 0.0 - 1.0 K/UL    ABS. EOSINOPHILS 0.1 0.0 - 0.4 K/UL    ABS. BASOPHILS 0.0 0.0 - 0.1 K/UL    ABS. IMM. GRANS. 0.1 (H) 0.00 - 0.04 K/UL    DF SMEAR SCANNED      RBC COMMENTS ANISOCYTOSIS  2+       METABOLIC PANEL, COMPREHENSIVE    Collection Time: 03/22/23  5:01 AM   Result Value Ref Range    Sodium 132 (L) 136 - 145 mmol/L    Potassium 4.2 3.5 - 5.1 mmol/L    Chloride 101 97 - 108 mmol/L    CO2 28 21 - 32 mmol/L    Anion gap 3 (L) 5 - 15 mmol/L    Glucose 142 (H) 65 - 100 mg/dL    BUN 9 6 - 20 MG/DL    Creatinine 0.72 0.70 - 1.30 MG/DL    BUN/Creatinine ratio 13 12 - 20      eGFR >60 >60 ml/min/1.73m2    Calcium 8.1 (L) 8.5 - 10.1 MG/DL    Bilirubin, total 0.4 0.2 - 1.0 MG/DL    ALT (SGPT) 107 (H) 12 - 78 U/L    AST (SGOT) 71 (H) 15 - 37 U/L    Alk. phosphatase 213 (H) 45 - 117 U/L    Protein, total 6.7 6.4 - 8.2 g/dL    Albumin 1.5 (L) 3.5 - 5.0 g/dL    Globulin 5.2 (H) 2.0 - 4.0 g/dL    A-G Ratio 0.3 (L) 1.1 - 2.2     GLUCOSE, POC    Collection Time: 03/22/23  8:17 AM   Result Value Ref Range    Glucose (POC) 148 (H) 65 - 117 mg/dL    Performed by Saulo Russell RN    GLUCOSE, POC    Collection Time: 03/22/23 11:09 AM   Result Value Ref Range    Glucose (POC) 180 (H) 65 - 117 mg/dL    Performed by Saulo Russell RN       ABD LTD   Final Result   Nephroureteral stent in place. Otherwise unremarkable limited ultrasound. CT HEAD WO CONT   Final Result   No acute intracranial hemorrhage, mass or infarct. CT ABD PELV WO CONT   Final Result      1. LEFT lower lobe pneumonia. There is a small LEFT effusion. 2. Trace amount of free fluid in the pelvis which is nonspecific.  No acute findings are seen in the abdomen or pelvis. 3. Bilateral percutaneous nephrostomy tubes in place. Right-sided double-J   ureteral stent is in satisfactory position. XR CHEST PORT   Final Result   No acute finding or significant change. XR CHEST PORT   Final Result   No Acute Disease. Discharge: time spent 40 minutes in discharge  Education and counseling.      Signed:  PEDRO Bob  3/22/2023  6:15 PM

## 2023-03-22 NOTE — PROGRESS NOTES
DISCHARGE SUMMARY FROM NURSE    The patient is stable for discharge. I have reviewed the discharge instructions with the patient. The patient verbalized understanding. All questions were fully answered. The patient verbalized no complaints. Hard scripts and medication handouts were given and reviewed with the patient. Appropriate educational materials and medication side effects teaching were also provided. Cardiac monitor and IV line(s) were removed. There were no personal belongings, valuables or home medications left at patient's bedside,  or safe.      [address]

## 2023-03-22 NOTE — PROGRESS NOTES
Transition of Care Plan to SNF/Rehab    Communication to Patient/Family:  Met with patient and family and they are agreeable to the transition plan. The Plan for Transition of Care is related to the following treatment goals:     CM informed that pt will transition to snf: [facility] on today, and transport was arranged for 12P, with [facility]. CM spoke with pts daughter: Ibis Pisano, via telephone to inform her of the following. Addy agreeable of the following. The Patient and/or patient representative was provided with a choice of provider and agrees  with the discharge plan. Yes [x] No []    A Freedom of choice list was provided with basic dialogue that supports the patient's individualized plan of care/goals and shares the quality data associated with the providers. Yes [x] No []    SNF/Rehab Transition:  Patient has been accepted to [facility] SNF/Rehab and meets criteria for admission. Patient will transported by [facility]  and expected to leave at 12P. Communication to SNF/Rehab:  Bedside RN, ONC Unit, has been notified to update the transition plan to the facility and call report ([phone]). Discharge information has been updated on the AVS. And communicated to facility via [employer], or CC link. Discharge instructions to be fax'd to facility at [facility] #). [] BCPI-A  Patient has been identified as part of the  BCPI-A Program.  For Care Coordination associated with that Bundle Program, please contact  Bundle information has been communication to . Nursing Please include all hard scripts for controlled substances, med rec and dc summary, and AVS in packet.      Reviewed and confirmed with facility, [facility], can manage the patient care needs for the following:     Robert Parents with (X) only those applicable:  Medication:  [x]Medications are available at the facility  [x]IV Antibiotics    []Controlled Substance - hard copies available sent. []Weekly Labs    Equipment:  []CPAP/BiPAP  []Wound Vacuum  []Campos or Urinary Device  []PICC/Central Line  []Nebulizer  []Ventilator    Treatment:  []Isolation (for MRSA, VRE, etc.)  []Surgical Drain Management  []Tracheostomy Care  []Dressing Changes  []Dialysis with transportation  []PEG Care  []Oxygen  []Daily Weights for Heart Failure    Dietary:  []Any diet limitations  []Tube Feedings   []Total Parenteral Management (TPN)    Financial Resources:  [x]Medicaid Application Completed    [x]UAI Completed and copy given to pt/family  and copy given to pt/family  []A screening has previously been completed. []Level II Completed    [] Private pay individual who will not become   financially eligible for Medicaid within 6 months from admission to a [address] [place] [address]. [] Individual refused to have screening conducted. []Medicaid Application Completed    []The screening denied because it was determined individual did not need/did not qualify for nursing facility level of care. [] Out of state residents seeking direct admission to a [address] facility. [] Individuals who are inpatients of an out of state hospital, or in state or out of state veterans/ hospital and seek direct admission to a [address] facility  [] Individuals who are pateints or residents of a state owned/operated facility that is licensed by Department of [employer] [facility] and seek direct admission to [address] [place] [place] [address]  [] A screening not required for enrollment in [address] [address] [address] services as set out in [address] [place] [address] 40-  [] [facility]) staff shall perform screenings of the [facility] clients. Advanced Care Plan:  []Surrogate Decision Maker of Care  []POA  []Communicated Code Status and copy sent.     Other:

## 2023-03-22 NOTE — PROGRESS NOTES
End of Shift Note    Bedside shift change report given to Leslie Alvarez (oncoming nurse) by Alice Soto RN (offgoing nurse). Report included the following information SBAR, Kardex, and MAR    Shift worked: 7 am to 7:30 pm     Shift summary and any significant changes: All scheduled medications administered, see MAR. Patient had pain in his left leg; 1 dose of PRN Oxycodone administered, see MAR. Patient had 3 loose bowel movements during shift. Bath, incontinence care and linen/gown change completed. Nephrostomy drains assessed and outputs documented. IV's flushed and patent. Patient to be discharged back to [facility] tomorrow at 12 pm. Nursing rounds and education completed. Concerns for physician to address:       Zone phone for oncoming shift:          Activity:  Activity Level: Bed Rest  Number times ambulated in hallways past shift: 0  Number of times OOB to chair past shift: 0    Cardiac:   Cardiac Monitoring: No      Cardiac Rhythm: Sinus Rhythm    Access:  Current line(s): PIV     Genitourinary:   Urinary status: voiding (bilateral nephrostomy tubes)    Respiratory:   O2 Device: None (Room air)  Chronic home O2 use?: NO  Incentive spirometer at bedside: NO       GI:  Last Bowel Movement Date: 03/21/23  Current diet:  ADULT DIET Regular  ADULT ORAL NUTRITION SUPPLEMENT Breakfast, Lunch, Dinner; Diabetic Supplement  DIET ONE TIME MESSAGE  Passing flatus: YES  Tolerating current diet: YES       Pain Management:   Patient states pain is manageable on current regimen: YES    Skin:  Armando Score: 14  Interventions: float heels    Patient Safety:  Fall Score:  Total Score: 1  Interventions: bed/chair alarm and gripper socks       Length of Stay:  Expected LOS: 4d 19h  Actual LOS: [address], RN

## 2023-03-22 NOTE — PROGRESS NOTES
Bedside shift change report given to MISSY Meredith (oncoming nurse) by Srini Duggan RN (offgoing nurse). Report included the following information SBAR, Kardex, ED Summary, Intake/Output, MAR, and Recent Results.

## 2023-03-23 LAB
BACTERIA SPEC CULT: NORMAL
SERVICE CMNT-IMP: NORMAL

## 2023-03-24 LAB
BACTERIA SPEC CULT: NORMAL
BACTERIA SPEC CULT: NORMAL
SERVICE CMNT-IMP: NORMAL
SERVICE CMNT-IMP: NORMAL